# Patient Record
Sex: MALE | Race: BLACK OR AFRICAN AMERICAN | NOT HISPANIC OR LATINO | Employment: UNEMPLOYED | URBAN - METROPOLITAN AREA
[De-identification: names, ages, dates, MRNs, and addresses within clinical notes are randomized per-mention and may not be internally consistent; named-entity substitution may affect disease eponyms.]

---

## 2023-10-01 PROBLEM — M87.052 AVASCULAR NECROSIS OF BONE OF LEFT HIP (MULTI): Status: ACTIVE | Noted: 2023-10-01

## 2023-10-01 PROBLEM — M87.051 AVASCULAR NECROSIS OF BONE OF RIGHT HIP (MULTI): Status: ACTIVE | Noted: 2023-10-01

## 2023-10-01 PROBLEM — M25.351 INSTABILITY OF RIGHT HIP JOINT: Status: ACTIVE | Noted: 2023-10-01

## 2023-10-01 RX ORDER — NAPROXEN 500 MG/1
1 TABLET ORAL EVERY 12 HOURS
COMMUNITY
Start: 2019-10-09 | End: 2023-10-03 | Stop reason: ALTCHOICE

## 2023-10-01 RX ORDER — MELOXICAM 15 MG/1
1 TABLET ORAL DAILY
COMMUNITY
Start: 2022-10-18 | End: 2023-10-03 | Stop reason: ALTCHOICE

## 2023-10-03 ENCOUNTER — HOSPITAL ENCOUNTER (OUTPATIENT)
Dept: RADIOLOGY | Facility: HOSPITAL | Age: 33
Discharge: HOME | End: 2023-10-03
Payer: MEDICAID

## 2023-10-03 ENCOUNTER — OFFICE VISIT (OUTPATIENT)
Dept: ORTHOPEDIC SURGERY | Facility: HOSPITAL | Age: 33
End: 2023-10-03
Payer: MEDICAID

## 2023-10-03 VITALS — WEIGHT: 211 LBS | BODY MASS INDEX: 29.54 KG/M2 | HEIGHT: 71 IN

## 2023-10-03 DIAGNOSIS — M87.051 AVASCULAR NECROSIS OF BONE OF RIGHT HIP (MULTI): Primary | ICD-10-CM

## 2023-10-03 DIAGNOSIS — M87.051 AVASCULAR NECROSIS OF BONE OF RIGHT HIP (MULTI): ICD-10-CM

## 2023-10-03 DIAGNOSIS — M25.551 RIGHT HIP PAIN: ICD-10-CM

## 2023-10-03 PROCEDURE — 73502 X-RAY EXAM HIP UNI 2-3 VIEWS: CPT | Mod: RT,FY

## 2023-10-03 PROCEDURE — 73502 X-RAY EXAM HIP UNI 2-3 VIEWS: CPT | Mod: RIGHT SIDE | Performed by: RADIOLOGY

## 2023-10-03 PROCEDURE — 99214 OFFICE O/P EST MOD 30 MIN: CPT | Performed by: ORTHOPAEDIC SURGERY

## 2023-10-03 PROCEDURE — 1036F TOBACCO NON-USER: CPT | Performed by: ORTHOPAEDIC SURGERY

## 2023-10-03 RX ORDER — MELOXICAM 15 MG/1
15 TABLET ORAL DAILY
Qty: 30 TABLET | Refills: 0 | Status: CANCELLED | OUTPATIENT
Start: 2023-10-03

## 2023-10-03 ASSESSMENT — PAIN - FUNCTIONAL ASSESSMENT: PAIN_FUNCTIONAL_ASSESSMENT: 0-10

## 2023-10-03 ASSESSMENT — PAIN DESCRIPTION - DESCRIPTORS: DESCRIPTORS: DISCOMFORT;PATIENT UNABLE TO DESCRIBE

## 2023-10-03 ASSESSMENT — PAIN SCALES - GENERAL: PAINLEVEL_OUTOF10: 10 - WORST POSSIBLE PAIN

## 2023-10-03 NOTE — PROGRESS NOTES
Subjective    Patient ID: Norbert Haro is a 33 y.o. male.    Chief Complaint: Pain of the Right Hip (AVN R Hip )     Last Surgery: No surgery found  Last Surgery Date: No surgery found    HPI  Patient is a pleasant 33 years with male who presents today for follow-up evaluation of right hip pain.  I saw him about a year ago with the same complaint.  He was noted to have severe avascular necrosis of the right hip with collapse.  I referred him to physical therapy and injection.  Patient performed therapy stretching exercises without improvement.  He also had image guided corticosteroid injection into the hip without improvement.  Patient reported that he is severely disabled at this point.  He is unable to walk due to pain in the hip.  He is unable to perform routine activities such as putting on his shoes.At this point patient would like to have his hip replaced.  Patient denies any drug use.  Patient reports that he has stayed out of trouble and has been clean since he was released from [facility].  He only has about 10 months left on his probation.  Patient plans to move in with his mother during his rehabitation after surgery.  Objective Ortho Exam  Gen: The patient is alert and oriented ×3, is in no acute distress, and appear their stated age and weight.     Patient has a severe otalgic gait on the right.  His right leg is slightly shorter than the left.  Patient has about 10 degrees fixed hip flexion contracture.  He has external rotation 30 degrees and -3 internal rotation.  Patient has obligatory external rotation with hip flexion.  Patient had pain in the groin with attempted rotational range of motion of the hip.  Image Results:  XR hip w pelvis  MRN: [id number]  Patient Name: NORBERT HARO     STUDY:  HIP, UNILATERAL W/PELVIS WHEN PERFORMED 2-3 VIEWS;  10/18/2022 10:04  am     INDICATION:  Instability of right hip joint; Pain of right hip joint  M25.551:  Pain of right hip joint M25.351: Instability of  right hip joint.     COMPARISON:  10/09/2019     ACCESSION NUMBER(S):  [id number]     ORDERING CLINICIAN:  DARYL CHIU     FINDINGS:  Pelvis and right hip, three views     There is sclerosis with collapse involving the right femoral head.  There is sclerosis and left femoral head as well. Mild degenerative  change present in the right hip. No joint space narrowing     IMPRESSION:  Avascular necrosis with collapse in the right femoral head. Avascular  necrosis in the left femoral head without collapse    I personally reviewed right hip radiograph and review of severe avascular necrosis with collapse and no preservation of joint space.  Assessment/Plan   Encounter Diagnoses:  Avascular necrosis of bone of right hip (CMS/HCC)  Patient has severe right hip pain with avascular necrosis and collapse.  Patient would like to pursue hip replacement surgery at this point.  I talked with the patient at length about risks, limitations, benefits and alternatives to total hip replacement today. Under my care or the care of previous providers, the patient has had a reasonable trial of nonoperative treatment for their hip problem including NSAIDS, tylenol  or other analgesics, activity modification and activity restriction and use of assistive devices.  These  previous treatments have not provided the patient with durable relief of their symptoms.The patient is not an appropriate candidate for physical therapy at this time. Despite the above, patient has had pain and symptomatic functional impairment that either interferes with their sleep or ADLs and quality of life. I reviewed concerns about implant wear, loosening, breakage, infection and infection prophylaxis, DVT, PE, death and other medical and anesthetic complications of surgery. We talked about the potential for persistent pain following surgery since there are many possible causes for hip and leg pain. The patient was advised that hip replacement will only relieve pain  that is coming from the hip. We talked about leg length discrepancy, neurovascular problems and dislocation after surgery. The patient understands that we may have to lengthen the leg slightly to provide for adequate stability of the hip. I reviewed dislocation precautions and activity restrictions in detail. We discussed advantages and disadvantages of cemented and cementless implant fixation. We discussed the concerns about intraoperative fracture, ingrowth failure, thigh pain and possible post-operative weight bearing restrictions following cementless hip replacement. We discussed advantages and disadvantages of different surgical approaches. The basic concepts of the joint replacement procedure has been reviewed with the patient and the patient has been provided the opportunity to see an actual implant either in the office or in our pre-op education class. We discussed the possible need for a homologous blood transfusion. We discussed the fact that many of our patients are able to go home as outpatient or 1 -2 days depending on their health, mobility, pre-op preparation, individual home situation and personal preference. The patient should take our pre-operative teaching class. All of the patients questions were answered. The patient can call my office to schedule surgery and the pre-op teaching class. I told the patient that they should contact their primary care physician to discuss fitness for surgery.          Note dictated with Clouds[employer] software.  Completed without full type editing and sent to avoid delay.    Orders Placed This Encounter    XR hip right 2 or 3 views    XR pelvis 1-2 views     No follow-ups on file.

## 2023-10-04 RX ORDER — MELOXICAM 15 MG/1
15 TABLET ORAL DAILY
Qty: 30 TABLET | Refills: 0 | Status: SHIPPED | OUTPATIENT
Start: 2023-10-04 | End: 2023-11-03

## 2023-10-31 ENCOUNTER — ANESTHESIA EVENT (OUTPATIENT)
Dept: OPERATING ROOM | Facility: HOSPITAL | Age: 33
End: 2023-10-31
Payer: MEDICAID

## 2023-11-01 ENCOUNTER — ANESTHESIA (OUTPATIENT)
Dept: OPERATING ROOM | Facility: HOSPITAL | Age: 33
End: 2023-11-01
Payer: MEDICAID

## 2023-11-01 NOTE — H&P
[facility] Department of Orthopaedic Surgery   Surgical History & Physical <30 Days  10/3/2023    Reason for Surgery: R Hip OA  Planned Procedure: R DA JED    History & Physical Reviewed:  I have reviewed the History and Physical for obtained within the last 30 days. Relevant findings and updates are noted below:  No significant changes.    Home medications were reviewed with significant updates noted below:  No significant changes.    ERAS patient?: No    COVID-19 Risk Consent:   Surgeon has reviewed the key risks related to arabella COVID-19 and subsequent sequelae.     11/01/23 at 6:01 AM - Yanick Sears MD

## 2023-11-13 ENCOUNTER — TELEMEDICINE CLINICAL SUPPORT (OUTPATIENT)
Dept: PREADMISSION TESTING | Facility: HOSPITAL | Age: 33
End: 2023-11-13
Payer: MEDICAID

## 2023-11-13 RX ORDER — IBUPROFEN 600 MG/1
600 TABLET ORAL EVERY 8 HOURS PRN
COMMUNITY
Start: 2023-10-26 | End: 2023-11-21 | Stop reason: ALTCHOICE

## 2023-11-13 RX ORDER — MELOXICAM 15 MG/1
15 TABLET ORAL DAILY
COMMUNITY
Start: 2023-10-04 | End: 2023-11-21 | Stop reason: ALTCHOICE

## 2023-11-13 RX ORDER — AMLODIPINE BESYLATE 5 MG/1
5 TABLET ORAL DAILY
COMMUNITY
Start: 2023-09-19

## 2023-11-21 ENCOUNTER — PRE-ADMISSION TESTING (OUTPATIENT)
Dept: PREADMISSION TESTING | Facility: HOSPITAL | Age: 33
End: 2023-11-21
Payer: MEDICAID

## 2023-11-21 ENCOUNTER — HOSPITAL ENCOUNTER (OUTPATIENT)
Dept: RADIOLOGY | Facility: HOSPITAL | Age: 33
Discharge: HOME | End: 2023-11-21
Payer: MEDICAID

## 2023-11-21 VITALS
DIASTOLIC BLOOD PRESSURE: 81 MMHG | WEIGHT: 195.11 LBS | BODY MASS INDEX: 27.31 KG/M2 | HEART RATE: 94 BPM | OXYGEN SATURATION: 97 % | HEIGHT: 71 IN | SYSTOLIC BLOOD PRESSURE: 126 MMHG | TEMPERATURE: 98 F

## 2023-11-21 DIAGNOSIS — Z01.818 PREOPERATIVE TESTING: Primary | ICD-10-CM

## 2023-11-21 LAB
ANION GAP SERPL CALC-SCNC: 16 MMOL/L (ref 10–20)
BUN SERPL-MCNC: 12 MG/DL (ref 6–23)
CALCIUM SERPL-MCNC: 8.8 MG/DL (ref 8.6–10.6)
CHLORIDE SERPL-SCNC: 104 MMOL/L (ref 98–107)
CO2 SERPL-SCNC: 23 MMOL/L (ref 21–32)
CREAT SERPL-MCNC: 1.03 MG/DL (ref 0.5–1.3)
ERYTHROCYTE [DISTWIDTH] IN BLOOD BY AUTOMATED COUNT: 12.4 % (ref 11.5–14.5)
GFR SERPL CREATININE-BSD FRML MDRD: >90 ML/MIN/1.73M*2
GLUCOSE SERPL-MCNC: 116 MG/DL (ref 74–99)
HCT VFR BLD AUTO: 48.1 % (ref 41–52)
HGB BLD-MCNC: 16.1 G/DL (ref 13.5–17.5)
MCH RBC QN AUTO: 31.8 PG (ref 26–34)
MCHC RBC AUTO-ENTMCNC: 33.5 G/DL (ref 32–36)
MCV RBC AUTO: 95 FL (ref 80–100)
NRBC BLD-RTO: 0 /100 WBCS (ref 0–0)
PLATELET # BLD AUTO: 241 X10*3/UL (ref 150–450)
POTASSIUM SERPL-SCNC: 4.5 MMOL/L (ref 3.5–5.3)
RBC # BLD AUTO: 5.07 X10*6/UL (ref 4.5–5.9)
SODIUM SERPL-SCNC: 138 MMOL/L (ref 136–145)
WBC # BLD AUTO: 5.3 X10*3/UL (ref 4.4–11.3)

## 2023-11-21 PROCEDURE — 72170 X-RAY EXAM OF PELVIS: CPT

## 2023-11-21 PROCEDURE — 87081 CULTURE SCREEN ONLY: CPT

## 2023-11-21 PROCEDURE — 85027 COMPLETE CBC AUTOMATED: CPT

## 2023-11-21 PROCEDURE — 80048 BASIC METABOLIC PNL TOTAL CA: CPT

## 2023-11-21 PROCEDURE — 36415 COLL VENOUS BLD VENIPUNCTURE: CPT

## 2023-11-21 PROCEDURE — 72170 X-RAY EXAM OF PELVIS: CPT | Performed by: RADIOLOGY

## 2023-11-21 RX ORDER — HYDROXYZINE PAMOATE 50 MG/1
50 CAPSULE ORAL 2 TIMES DAILY
COMMUNITY
Start: 2023-07-31

## 2023-11-21 RX ORDER — NAPROXEN 375 MG/1
375 TABLET ORAL
COMMUNITY
Start: 2023-08-31 | End: 2023-11-30 | Stop reason: HOSPADM

## 2023-11-21 RX ORDER — CHLORHEXIDINE GLUCONATE ORAL RINSE 1.2 MG/ML
SOLUTION DENTAL
Qty: 15 ML | Refills: 0 | Status: SHIPPED | OUTPATIENT
Start: 2023-11-21 | End: 2023-11-21 | Stop reason: ENTERED-IN-ERROR

## 2023-11-21 RX ORDER — CHLORHEXIDINE GLUCONATE 40 MG/ML
SOLUTION TOPICAL
Qty: 473 ML | Refills: 0 | Status: SHIPPED | OUTPATIENT
Start: 2023-11-21 | End: 2023-11-21 | Stop reason: ENTERED-IN-ERROR

## 2023-11-21 RX ORDER — ACETAMINOPHEN 325 MG/1
650 TABLET ORAL EVERY 4 HOURS PRN
COMMUNITY
Start: 2023-01-09 | End: 2023-11-30 | Stop reason: HOSPADM

## 2023-11-21 RX ORDER — CHLORHEXIDINE GLUCONATE 40 MG/ML
SOLUTION TOPICAL
Qty: 473 ML | Refills: 0 | Status: SHIPPED | OUTPATIENT
Start: 2023-11-21 | End: 2023-11-30 | Stop reason: HOSPADM

## 2023-11-21 RX ORDER — TRAZODONE HYDROCHLORIDE 50 MG/1
150 TABLET ORAL NIGHTLY
COMMUNITY
Start: 2023-09-18 | End: 2023-12-31 | Stop reason: SDUPTHER

## 2023-11-21 RX ORDER — CHLORHEXIDINE GLUCONATE ORAL RINSE 1.2 MG/ML
SOLUTION DENTAL
Qty: 15 ML | Refills: 0 | Status: SHIPPED | OUTPATIENT
Start: 2023-11-21 | End: 2023-11-30 | Stop reason: HOSPADM

## 2023-11-21 RX ORDER — ARIPIPRAZOLE 5 MG/1
5 TABLET ORAL
COMMUNITY
Start: 2023-07-31

## 2023-11-21 ASSESSMENT — DUKE ACTIVITY SCORE INDEX (DASI)
CAN YOU PARTICIPATE IN STRENOUS SPORTS LIKE SWIMMING, SINGLES TENNIS, FOOTBALL, BASKETBALL, OR SKIING: NO
CAN YOU RUN A SHORT DISTANCE: NO
CAN YOU DO YARD WORK LIKE RAKING LEAVES, WEEDING OR PUSHING A MOWER: NO
CAN YOU TAKE CARE OF YOURSELF (EAT, DRESS, BATHE, OR USE TOILET): YES
CAN YOU CLIMB A FLIGHT OF STAIRS OR WALK UP A HILL: NO
CAN YOU DO MODERATE WORK AROUND THE HOUSE LIKE VACUUMING, SWEEPING FLOORS OR CARRYING GROCERIES: NO
TOTAL_SCORE: 4.5
CAN YOU PARTICIPATE IN MODERATE RECREATIONAL ACTIVITIES LIKE GOLF, BOWLING, DANCING, DOUBLES TENNIS OR THROWING A BASEBALL OR FOOTBALL: NO
DASI METS SCORE: 3.3
CAN YOU WALK INDOORS, SUCH AS AROUND YOUR HOUSE: YES
CAN YOU HAVE SEXUAL RELATIONS: NO
CAN YOU DO LIGHT WORK AROUND THE HOUSE LIKE DUSTING OR WASHING DISHES: NO
CAN YOU WALK A BLOCK OR TWO ON LEVEL GROUND: NO
CAN YOU DO HEAVY WORK AROUND THE HOUSE LIKE SCRUBBING FLOORS OR LIFTING AND MOVING HEAVY FURNITURE: NO

## 2023-11-21 ASSESSMENT — CHADS2 SCORE
PRIOR STROKE OR TIA OR THROMBOEMBOLISM: NO
AGE GREATER THAN OR EQUAL TO 75: NO
HYPERTENSION: NO
DIABETES: NO
CHADS2 SCORE: 0
CHF: NO

## 2023-11-21 ASSESSMENT — LIFESTYLE VARIABLES: SMOKING_STATUS: SMOKER

## 2023-11-21 NOTE — CPM/PAT H&P
CPM/PAT Evaluation       Name: Norbert Haro (Norbert Haro)  /Age: 1990/33 y.o.     Visit Type:   In-Person       Chief Complaint: preoperative evaluation, right hip pain    HPI  The patient is a 33 year old Black male with complaints of ongoing right hip pain. He is noted to have severe avascular necrosis of the right hip with collapse. He has failed medical management and wishes to proceed with surgical intervention. He presents today for perioperative evaluation in anticipation of Right Arthroplasty Total Hip Direct Anterior Approach on 23 with Dr. Aguayo.    Past Medical History:   Diagnosis Date    Joint pain 10/03/2023    Ortho:Trevor OLIVAS hip       History reviewed. No pertinent surgical history.    Patient Sexual activity questions deferred to the physician.    Family History   Problem Relation Name Age of Onset    Lung cancer Father         No Known Allergies    Prior to Admission medications    Medication Sig Start Date End Date Taking? Authorizing Provider   acetaminophen (Tylenol) 325 mg tablet Take 2 tablets (650 mg) by mouth every 4 hours if needed. 23  Yes Historical Provider, MD   ARIPiprazole (Abilify) 5 mg tablet Take 1 tablet (5 mg) by mouth once daily. 23  Yes Historical Provider, MD   hydrOXYzine pamoate (Vistaril) 50 mg capsule Take 1 capsule (50 mg) by mouth twice a day. 23  Yes Historical Provider, MD   naproxen (Naprosyn) 375 mg tablet Take 1 tablet (375 mg) by mouth 2 times a day with meals. 23  Yes Historical Provider, MD   traZODone (Desyrel) 50 mg tablet Take 3 tablets (150 mg) by mouth once daily at bedtime. 23  Yes Historical Provider, MD   amLODIPine (Norvasc) 5 mg tablet Take 1 tablet (5 mg) by mouth once daily. 23   Historical Provider, MD   chlorhexidine (Hibiclens) 4 % external liquid Use as directed daily preoperatively 23   AILYN Shaw-CNP   chlorhexidine (Peridex) 0.12 % solution Swish and spit. Do  not swallow. Use the night before and morning of surgery as directed 11/21/23   ORION Shaw   chlorhexidine (Hibiclens) 4 % external liquid Use as directed daily preoperatively 11/21/23 11/21/23  ORION Shaw   chlorhexidine (Peridex) 0.12 % solution Swish and spit. Do not swallow. Use the night before and morning of surgery as directed 11/21/23 11/21/23  ORION Shaw    mg tablet Take 1 tablet (600 mg) by mouth every 8 hours if needed for moderate pain (4 - 6). 10/26/23 11/21/23  Historical Provider, MD   meloxicam (Mobic) 15 mg tablet Take 1 tablet (15 mg) by mouth once daily. 10/4/23 11/21/23  Historical Provider, MD BOND ROS:   Constitutional:   neg    Neuro/Psych:   neg    Eyes:   neg    Ears:   neg    Nose:   neg    Mouth:   neg    Throat:   neg    Neck:   neg    Cardio:   neg    Respiratory:   neg    Endocrine:   neg    GI:   neg    :   neg    Musculoskeletal:    Right hip pain   arthralgias  Hematologic:   neg    Skin:  neg        Physical Exam  Vitals reviewed.   Constitutional:       Appearance: Normal appearance.   HENT:      Head: Normocephalic and atraumatic.      Nose: Nose normal.      Mouth/Throat:      Mouth: Mucous membranes are moist.      Pharynx: Oropharynx is clear.   Eyes:      Extraocular Movements: Extraocular movements intact.      Pupils: Pupils are equal, round, and reactive to light.   Cardiovascular:      Rate and Rhythm: Normal rate and regular rhythm.      Pulses: Normal pulses.      Heart sounds: Normal heart sounds.   Pulmonary:      Effort: Pulmonary effort is normal.      Breath sounds: Normal breath sounds.   Musculoskeletal:         General: Normal range of motion.      Cervical back: Normal range of motion.   Skin:     General: Skin is warm and dry.   Neurological:      General: No focal deficit present.      Mental Status: He is alert and oriented to person, place, and time.   Psychiatric:         Mood and Affect: Mood  normal.         Behavior: Behavior normal.          PAT AIRWAY:   Airway:     Mallampati::  III    TM distance::  >3 FB    Neck ROM::  Full  normal        Visit Vitals  /81   Pulse 94   Temp 36.7 °C (98 °F) (Oral)       DASI Risk Score      Flowsheet Row Most Recent Value   DASI SCORE 4.5   METS Score (Will be calculated only when all the questions are answered) 3.3          Caprini DVT Assessment      Flowsheet Row Most Recent Value   DVT Score 6   Current Status Elective major lower extremity arthroplasty   Age Less than 40 years   BMI 30 or less          Modified Frailty Index      Flowsheet Row Most Recent Value   Modified Frailty Index Calculator 0          CHADS2 Stroke Risk         N/A 3 - 100%: High Risk   2 - 3%: Medium Risk   0 - 2%: Low Risk     Last Change: N/A          This score determines the patient's risk of having a stroke if the patient has atrial fibrillation.        This score is not applicable to this patient. Components are not calculated.          Revised Cardiac Risk Index      Flowsheet Row Most Recent Value   Revised Cardiac Risk Calculator 0          Apfel Simplified Score      Flowsheet Row Most Recent Value   Apfel Simplified Score Calculator 1          Risk Analysis Index Results This Encounter    No data found in the last 1 encounters.       Stop Bang Score      Flowsheet Row Most Recent Value   Do you snore loudly? 0   Do you often feel tired or fatigued after your sleep? 0   Has anyone ever observed you stop breathing in your sleep? 0   Do you have or are you being treated for high blood pressure? 1   Recent BMI (Calculated) 29.4   Is BMI greater than 35 kg/m2? 0=No   Age older than 50 years old? 0=No   Is your neck circumference greater than 17 inches (Male) or 16 inches (Female)? 0   Gender - Male 1=Yes   STOP-BANG Total Score 2          Recent Results (from the past 336 hour(s))   Staphylococcus aureus/MRSA colonization, Culture    Collection Time: 11/21/23 11:41 AM     Specimen: Anterior Nares; Swab   Result Value Ref Range    Staph/MRSA Screen Culture No Staphylococcus aureus isolated    Basic Metabolic Panel    Collection Time: 11/21/23 11:41 AM   Result Value Ref Range    Glucose 116 (H) 74 - 99 mg/dL    Sodium 138 136 - 145 mmol/L    Potassium 4.5 3.5 - 5.3 mmol/L    Chloride 104 98 - 107 mmol/L    Bicarbonate 23 21 - 32 mmol/L    Anion Gap 16 10 - 20 mmol/L    Urea Nitrogen 12 6 - 23 mg/dL    Creatinine 1.03 0.50 - 1.30 mg/dL    eGFR >90 >60 mL/min/1.73m*2    Calcium 8.8 8.6 - 10.6 mg/dL   CBC    Collection Time: 11/21/23 11:41 AM   Result Value Ref Range    WBC 5.3 4.4 - 11.3 x10*3/uL    nRBC 0.0 0.0 - 0.0 /100 WBCs    RBC 5.07 4.50 - 5.90 x10*6/uL    Hemoglobin 16.1 13.5 - 17.5 g/dL    Hematocrit 48.1 41.0 - 52.0 %    MCV 95 80 - 100 fL    MCH 31.8 26.0 - 34.0 pg    MCHC 33.5 32.0 - 36.0 g/dL    RDW 12.4 11.5 - 14.5 %    Platelets 241 150 - 450 x10*3/uL            Assessment and Plan:     Neuro:   The patient has no neurological diagnoses or significant findings on chart review, clinical presentation, and evaluation.  No grossly apparent perioperative risk.    HEENT/Airway  No diagnoses, significant findings on chart review, clinical presentation, or evaluation.    Cardiovascular  The patient is scheduled for non-cardiac surgery associated with elevated risk.  The patient has no major cardiac contraindications to non- cardiac surgery.  RCRI  The patient meets 0-1 RCRI criteria and therefore has a less than 1% risk of major adverse cardiac complications.  METS  The patient's functional capacity capacity is greater than 4 METS.  EKG  The patient has no history of EKG or echocardiographic changes concerning for myocardial ischemia.  No further cardiac evaluation is indicated  Heart Failure  The patient has no known history of heart failure.  Additionally, the patient reports no symptoms of heart failure and demonstrates no signs of heart failure.  Hypertension Evaluation  The  patient has no known history of hypertension and has a normal blood pressure today.  Heart Rhythm Evaluation  The patient has no history of arrhythmias.  Heart Valve Evaluation  The patient has no known history of valvular heart disease. The patient has no symptoms or physical exam findings to suggest valvular heart disease.  CARDS EVAL  The patient is not followed by cardiology.  The patient has a 30-day risk for MACE of 0 predictors, 3.9% risk for cardiac death, nonfatal myocardial infarction, and nonfatal cardiac arrest.  SARAH score which indicates a 0% risk of intraoperative or 30-day postoperative.    Pulmonary   No significant findings on chart review or clinical presentation and evaluation. The patient is at increased risk of perioperative pulmonary complications secondary to ongoing tobacco abuse.   The patient has a stop bang score of 2, which places patient at low risk for having MARYLU.  ARISCAT 0, low, 1.6% risk of in-hospital postoperative pulmonary complications  PRODIGY 11, intermediate of respiratory depression episode.    Hematology  No diagnoses or significant findings on chart review or clinical presentation and evaluation.  Antiplatelet management   The patient is not currently receiving antiplatelet therapy.  Anticoagulation management  The patient is not currently receiving anticoagulation therapy.,  Patient provided with DVT educational handout.    Caprini score 6, high risk of perioperative VTE    GI  No diagnoses or significant findings on chart review or clinical presentation and evaluation.  Eat 10- 0,  self-perceived oropharyngeal dysphagia scale (0-40)     Genitourinary  No diagnoses or significant findings on chart review or clinical presentation and evaluation.    Renal  The patient has no known history of chronic kidney disease. No renal diagnoses or significant findings on chart review or clinical presentation and evaluation. The patient has specific risk factors associated with  increased risk of perioperative renal complications due to male gender. Preventative measures include preoperative hydration.    Musculoskeletal  The patient has diagnoses or significant findings on chart review or clinical presentation and evaluation significant for right hip pain. Scheduled for arthoplasty 11/30/23. Avoid NSAIDS 7 days prior to surgery.    Endocrine  Diabetes Evaluation  The patient has no history of diabetes mellitus  Thyroid Disease Evaluation  The patient has no history of thyroid disease.    ID  No diagnoses or significant findings on chart review or clinical presentation and evaluation.    -Preoperative medication instructions were provided and reviewed with the patient.  Any additional testing or evaluation was explained to the patient.  NPO Instructions were discussed, and the patient's questions were answered prior to conclusion of this encounter

## 2023-11-21 NOTE — PREPROCEDURE INSTRUCTIONS
NPO Instructions:    Do not eat any food after midnight the night before your surgery/procedure.  You may have up to TEN ounces of clear liquids until TWO hours before your instructed arrival time to the hospital. This includes water, black tea/coffee, (no milk or cream), apple juice, and/or electrolyte drinks (Gatorade).  Yo may chew gum up to TWO hours before your surgery/procedure.    Additional Instructions:     We have sent a prescription for Hibiclens soap and Peridex mouth wash to your preferred pharmacy.  If you have not already, Please  your prescription and start using five days before surgery.  Follow the instruction sheet provided to you at your CPM/PAT appointment.    Avoid herbal supplements, multivitamins and NSAIDS (non-steroidal anti-inflammatory drugs) such as Advil, Aleve, Ibuprofen, Naproxen, Excedrin, Meloxicam or Celebrex for at least 7 days prior to surgery. May take Tylenol as needed.    Seven/Six Days before Surgery:  Review your medication instructions, stop indicated medications    Day of Surgery:  Review your medication instructions, take indicated medications  Wear comfortable loose fitting clothing  Do not use moisturizers, creams, lotions or perfume  All jewelry and valuables should be left at home    Jose Armando Garcia [facility]  [facility] Per[facility]  Kaxzp-[phone]  [phone]  [email or web address]

## 2023-11-22 LAB — STAPHYLOCOCCUS SPEC CULT: NORMAL

## 2023-11-29 ASSESSMENT — ENCOUNTER SYMPTOMS
CONSTITUTIONAL NEGATIVE: 1
GASTROINTESTINAL NEGATIVE: 1
ARTHRALGIAS: 1
EYES NEGATIVE: 1
RESPIRATORY NEGATIVE: 1
ENDOCRINE NEGATIVE: 1
CARDIOVASCULAR NEGATIVE: 1
NECK NEGATIVE: 1
NEUROLOGICAL NEGATIVE: 1

## 2023-11-30 ENCOUNTER — HOSPITAL ENCOUNTER (OUTPATIENT)
Facility: HOSPITAL | Age: 33
Setting detail: OUTPATIENT SURGERY
Discharge: HOME | End: 2023-11-30
Attending: ORTHOPAEDIC SURGERY | Admitting: ORTHOPAEDIC SURGERY
Payer: MEDICAID

## 2023-11-30 ENCOUNTER — APPOINTMENT (OUTPATIENT)
Dept: RADIOLOGY | Facility: HOSPITAL | Age: 33
End: 2023-11-30
Payer: MEDICAID

## 2023-11-30 ENCOUNTER — HOME HEALTH ADMISSION (OUTPATIENT)
Dept: HOME HEALTH SERVICES | Facility: HOME HEALTH | Age: 33
End: 2023-11-30
Payer: MEDICAID

## 2023-11-30 VITALS
HEART RATE: 70 BPM | DIASTOLIC BLOOD PRESSURE: 81 MMHG | RESPIRATION RATE: 14 BRPM | OXYGEN SATURATION: 97 % | BODY MASS INDEX: 27.44 KG/M2 | WEIGHT: 195.99 LBS | TEMPERATURE: 97.5 F | HEIGHT: 71 IN | SYSTOLIC BLOOD PRESSURE: 120 MMHG

## 2023-11-30 DIAGNOSIS — M87.051 AVASCULAR NECROSIS OF BONE OF RIGHT HIP (MULTI): Primary | ICD-10-CM

## 2023-11-30 PROCEDURE — 7100000010 HC PHASE TWO TIME - EACH INCREMENTAL 1 MINUTE: Performed by: ORTHOPAEDIC SURGERY

## 2023-11-30 PROCEDURE — 2780000003 HC OR 278 NO HCPCS: Performed by: ORTHOPAEDIC SURGERY

## 2023-11-30 PROCEDURE — 3600000018 HC OR TIME - INITIAL BASE CHARGE - PROCEDURE LEVEL SIX: Performed by: ORTHOPAEDIC SURGERY

## 2023-11-30 PROCEDURE — 2500000004 HC RX 250 GENERAL PHARMACY W/ HCPCS (ALT 636 FOR OP/ED): Mod: SE

## 2023-11-30 PROCEDURE — 76000 FLUOROSCOPY <1 HR PHYS/QHP: CPT

## 2023-11-30 PROCEDURE — 7100000009 HC PHASE TWO TIME - INITIAL BASE CHARGE: Performed by: ORTHOPAEDIC SURGERY

## 2023-11-30 PROCEDURE — 97163 PT EVAL HIGH COMPLEX 45 MIN: CPT | Mod: GP | Performed by: PHYSICAL THERAPIST

## 2023-11-30 PROCEDURE — 99223 1ST HOSP IP/OBS HIGH 75: CPT | Performed by: STUDENT IN AN ORGANIZED HEALTH CARE EDUCATION/TRAINING PROGRAM

## 2023-11-30 PROCEDURE — 3700000001 HC GENERAL ANESTHESIA TIME - INITIAL BASE CHARGE: Performed by: ORTHOPAEDIC SURGERY

## 2023-11-30 PROCEDURE — 72170 X-RAY EXAM OF PELVIS: CPT

## 2023-11-30 PROCEDURE — 2500000005 HC RX 250 GENERAL PHARMACY W/O HCPCS: Mod: SE | Performed by: ANESTHESIOLOGY

## 2023-11-30 PROCEDURE — 72170 X-RAY EXAM OF PELVIS: CPT | Performed by: RADIOLOGY

## 2023-11-30 PROCEDURE — 2500000004 HC RX 250 GENERAL PHARMACY W/ HCPCS (ALT 636 FOR OP/ED): Mod: SE | Performed by: ANESTHESIOLOGY

## 2023-11-30 PROCEDURE — 2500000001 HC RX 250 WO HCPCS SELF ADMINISTERED DRUGS (ALT 637 FOR MEDICARE OP): Mod: SE

## 2023-11-30 PROCEDURE — C1776 JOINT DEVICE (IMPLANTABLE): HCPCS | Performed by: ORTHOPAEDIC SURGERY

## 2023-11-30 PROCEDURE — 2500000004 HC RX 250 GENERAL PHARMACY W/ HCPCS (ALT 636 FOR OP/ED): Mod: SE | Performed by: ORTHOPAEDIC SURGERY

## 2023-11-30 PROCEDURE — 76942 ECHO GUIDE FOR BIOPSY: CPT | Performed by: ANESTHESIOLOGY

## 2023-11-30 PROCEDURE — 3700000002 HC GENERAL ANESTHESIA TIME - EACH INCREMENTAL 1 MINUTE: Performed by: ORTHOPAEDIC SURGERY

## 2023-11-30 PROCEDURE — 7100000001 HC RECOVERY ROOM TIME - INITIAL BASE CHARGE: Performed by: ORTHOPAEDIC SURGERY

## 2023-11-30 PROCEDURE — 7100000002 HC RECOVERY ROOM TIME - EACH INCREMENTAL 1 MINUTE: Performed by: ORTHOPAEDIC SURGERY

## 2023-11-30 PROCEDURE — 27130 TOTAL HIP ARTHROPLASTY: CPT | Performed by: ORTHOPAEDIC SURGERY

## 2023-11-30 PROCEDURE — 3600000017 HC OR TIME - EACH INCREMENTAL 1 MINUTE - PROCEDURE LEVEL SIX: Performed by: ORTHOPAEDIC SURGERY

## 2023-11-30 PROCEDURE — C1713 ANCHOR/SCREW BN/BN,TIS/BN: HCPCS | Performed by: ORTHOPAEDIC SURGERY

## 2023-11-30 PROCEDURE — 2720000007 HC OR 272 NO HCPCS: Performed by: ORTHOPAEDIC SURGERY

## 2023-11-30 PROCEDURE — A27130 PR TOTAL HIP ARTHROPLASTY: Performed by: ANESTHESIOLOGY

## 2023-11-30 PROCEDURE — A27130 PR TOTAL HIP ARTHROPLASTY: Performed by: NURSE ANESTHETIST, CERTIFIED REGISTERED

## 2023-11-30 PROCEDURE — 2500000005 HC RX 250 GENERAL PHARMACY W/O HCPCS: Mod: SE

## 2023-11-30 DEVICE — IMPLANTABLE DEVICE: Type: IMPLANTABLE DEVICE | Site: HIP | Status: FUNCTIONAL

## 2023-11-30 DEVICE — SCREW, PINNACLE CANCELL 6.5 X 50: Type: IMPLANTABLE DEVICE | Site: HIP | Status: FUNCTIONAL

## 2023-11-30 DEVICE — SCREW CANCELLOUS 6.5 X 25: Type: IMPLANTABLE DEVICE | Site: HIP | Status: FUNCTIONAL

## 2023-11-30 DEVICE — ACETABULAR CUP, SECTOR, GRIPTON, SIZE 56MM: Type: IMPLANTABLE DEVICE | Site: HIP | Status: FUNCTIONAL

## 2023-11-30 DEVICE — FEMORAL HEAD, CERAMIC 36 +5: Type: IMPLANTABLE DEVICE | Site: HIP | Status: FUNCTIONAL

## 2023-11-30 RX ORDER — HYDROMORPHONE HYDROCHLORIDE 1 MG/ML
0.2 INJECTION, SOLUTION INTRAMUSCULAR; INTRAVENOUS; SUBCUTANEOUS
Status: DISCONTINUED | OUTPATIENT
Start: 2023-11-30 | End: 2023-11-30 | Stop reason: HOSPADM

## 2023-11-30 RX ORDER — OXYCODONE HYDROCHLORIDE 5 MG/1
5 TABLET ORAL EVERY 6 HOURS PRN
Status: DISCONTINUED | OUTPATIENT
Start: 2023-11-30 | End: 2023-11-30 | Stop reason: HOSPADM

## 2023-11-30 RX ORDER — OXYCODONE HYDROCHLORIDE 5 MG/1
5 TABLET ORAL EVERY 4 HOURS PRN
Status: DISCONTINUED | OUTPATIENT
Start: 2023-11-30 | End: 2023-11-30 | Stop reason: HOSPADM

## 2023-11-30 RX ORDER — ONDANSETRON HYDROCHLORIDE 2 MG/ML
INJECTION, SOLUTION INTRAVENOUS AS NEEDED
Status: DISCONTINUED | OUTPATIENT
Start: 2023-11-30 | End: 2023-11-30

## 2023-11-30 RX ORDER — CYCLOBENZAPRINE HCL 10 MG
10 TABLET ORAL ONCE AS NEEDED
Status: COMPLETED | OUTPATIENT
Start: 2023-11-30 | End: 2023-11-30

## 2023-11-30 RX ORDER — ONDANSETRON HYDROCHLORIDE 2 MG/ML
4 INJECTION, SOLUTION INTRAVENOUS ONCE AS NEEDED
Status: DISCONTINUED | OUTPATIENT
Start: 2023-11-30 | End: 2023-11-30 | Stop reason: HOSPADM

## 2023-11-30 RX ORDER — PROPOFOL 10 MG/ML
INJECTION, EMULSION INTRAVENOUS AS NEEDED
Status: DISCONTINUED | OUTPATIENT
Start: 2023-11-30 | End: 2023-11-30

## 2023-11-30 RX ORDER — DOCUSATE SODIUM 100 MG/1
100 CAPSULE, LIQUID FILLED ORAL DAILY
Qty: 14 CAPSULE | Refills: 0 | Status: SHIPPED | OUTPATIENT
Start: 2023-11-30 | End: 2023-12-14

## 2023-11-30 RX ORDER — FENTANYL CITRATE 50 UG/ML
INJECTION, SOLUTION INTRAMUSCULAR; INTRAVENOUS AS NEEDED
Status: DISCONTINUED | OUTPATIENT
Start: 2023-11-30 | End: 2023-11-30

## 2023-11-30 RX ORDER — CEFAZOLIN SODIUM 2 G/100ML
2 INJECTION, SOLUTION INTRAVENOUS EVERY 8 HOURS
Status: DISCONTINUED | OUTPATIENT
Start: 2023-11-30 | End: 2023-11-30 | Stop reason: HOSPADM

## 2023-11-30 RX ORDER — SCOPOLAMINE 1 MG/3D
1 PATCH, EXTENDED RELEASE TRANSDERMAL ONCE
Status: DISCONTINUED | OUTPATIENT
Start: 2023-11-30 | End: 2023-11-30 | Stop reason: HOSPADM

## 2023-11-30 RX ORDER — HYDROMORPHONE HYDROCHLORIDE 1 MG/ML
0.5 INJECTION, SOLUTION INTRAMUSCULAR; INTRAVENOUS; SUBCUTANEOUS EVERY 2 HOUR PRN
Status: DISCONTINUED | OUTPATIENT
Start: 2023-11-30 | End: 2023-11-30 | Stop reason: HOSPADM

## 2023-11-30 RX ORDER — OXYCODONE HYDROCHLORIDE 5 MG/1
10 TABLET ORAL EVERY 4 HOURS PRN
Status: DISCONTINUED | OUTPATIENT
Start: 2023-11-30 | End: 2023-11-30 | Stop reason: HOSPADM

## 2023-11-30 RX ORDER — VANCOMYCIN HYDROCHLORIDE 1 G/20ML
INJECTION, POWDER, LYOPHILIZED, FOR SOLUTION INTRAVENOUS AS NEEDED
Status: DISCONTINUED | OUTPATIENT
Start: 2023-11-30 | End: 2023-11-30 | Stop reason: HOSPADM

## 2023-11-30 RX ORDER — HYDROMORPHONE HYDROCHLORIDE 1 MG/ML
0.5 INJECTION, SOLUTION INTRAMUSCULAR; INTRAVENOUS; SUBCUTANEOUS EVERY 5 MIN PRN
Status: DISCONTINUED | OUTPATIENT
Start: 2023-11-30 | End: 2023-11-30 | Stop reason: HOSPADM

## 2023-11-30 RX ORDER — ASPIRIN 81 MG/1
81 TABLET ORAL 2 TIMES DAILY
Qty: 60 TABLET | Refills: 0 | Status: SHIPPED | OUTPATIENT
Start: 2023-11-30 | End: 2023-12-30

## 2023-11-30 RX ORDER — CEFAZOLIN SODIUM 2 G/100ML
INJECTION, SOLUTION INTRAVENOUS AS NEEDED
Status: DISCONTINUED | OUTPATIENT
Start: 2023-11-30 | End: 2023-11-30

## 2023-11-30 RX ORDER — CELECOXIB 200 MG/1
CAPSULE ORAL AS NEEDED
Status: DISCONTINUED | OUTPATIENT
Start: 2023-11-30 | End: 2023-11-30

## 2023-11-30 RX ORDER — ACETAMINOPHEN 325 MG/1
650 TABLET ORAL EVERY 4 HOURS PRN
Status: DISCONTINUED | OUTPATIENT
Start: 2023-11-30 | End: 2023-11-30 | Stop reason: HOSPADM

## 2023-11-30 RX ORDER — KETOROLAC TROMETHAMINE 30 MG/ML
30 INJECTION, SOLUTION INTRAMUSCULAR; INTRAVENOUS ONCE
Status: DISCONTINUED | OUTPATIENT
Start: 2023-11-30 | End: 2023-11-30 | Stop reason: HOSPADM

## 2023-11-30 RX ORDER — ACETAMINOPHEN 325 MG/1
TABLET ORAL AS NEEDED
Status: DISCONTINUED | OUTPATIENT
Start: 2023-11-30 | End: 2023-11-30

## 2023-11-30 RX ORDER — TRANEXAMIC ACID 100 MG/ML
INJECTION, SOLUTION INTRAVENOUS AS NEEDED
Status: DISCONTINUED | OUTPATIENT
Start: 2023-11-30 | End: 2023-11-30

## 2023-11-30 RX ORDER — SODIUM CHLORIDE, SODIUM LACTATE, POTASSIUM CHLORIDE, CALCIUM CHLORIDE 600; 310; 30; 20 MG/100ML; MG/100ML; MG/100ML; MG/100ML
100 INJECTION, SOLUTION INTRAVENOUS CONTINUOUS
Status: DISCONTINUED | OUTPATIENT
Start: 2023-11-30 | End: 2023-11-30 | Stop reason: HOSPADM

## 2023-11-30 RX ORDER — MIDAZOLAM HYDROCHLORIDE 1 MG/ML
INJECTION INTRAMUSCULAR; INTRAVENOUS AS NEEDED
Status: DISCONTINUED | OUTPATIENT
Start: 2023-11-30 | End: 2023-11-30

## 2023-11-30 RX ORDER — OXYCODONE AND ACETAMINOPHEN 5; 325 MG/1; MG/1
1 TABLET ORAL EVERY 6 HOURS PRN
Qty: 28 TABLET | Refills: 0 | Status: SHIPPED | OUTPATIENT
Start: 2023-11-30 | End: 2023-12-07

## 2023-11-30 RX ORDER — PROPOFOL 10 MG/ML
INJECTION, EMULSION INTRAVENOUS CONTINUOUS PRN
Status: DISCONTINUED | OUTPATIENT
Start: 2023-11-30 | End: 2023-11-30

## 2023-11-30 RX ORDER — NALOXONE HYDROCHLORIDE 0.4 MG/ML
0.2 INJECTION, SOLUTION INTRAMUSCULAR; INTRAVENOUS; SUBCUTANEOUS EVERY 5 MIN PRN
Status: DISCONTINUED | OUTPATIENT
Start: 2023-11-30 | End: 2023-11-30 | Stop reason: HOSPADM

## 2023-11-30 RX ORDER — LIDOCAINE HYDROCHLORIDE 10 MG/ML
0.1 INJECTION, SOLUTION INFILTRATION; PERINEURAL ONCE
Status: DISCONTINUED | OUTPATIENT
Start: 2023-11-30 | End: 2023-11-30 | Stop reason: HOSPADM

## 2023-11-30 RX ORDER — GABAPENTIN 300 MG/1
CAPSULE ORAL AS NEEDED
Status: DISCONTINUED | OUTPATIENT
Start: 2023-11-30 | End: 2023-11-30

## 2023-11-30 RX ADMIN — PROPOFOL 50 MG: 10 INJECTION, EMULSION INTRAVENOUS at 07:42

## 2023-11-30 RX ADMIN — PROPOFOL 150 MCG/KG/MIN: 10 INJECTION, EMULSION INTRAVENOUS at 07:42

## 2023-11-30 RX ADMIN — OXYCODONE HYDROCHLORIDE 10 MG: 5 TABLET ORAL at 11:18

## 2023-11-30 RX ADMIN — MIDAZOLAM HYDROCHLORIDE 2 MG: 1 INJECTION, SOLUTION INTRAMUSCULAR; INTRAVENOUS at 07:16

## 2023-11-30 RX ADMIN — GABAPENTIN 300 MG: 300 CAPSULE ORAL at 07:08

## 2023-11-30 RX ADMIN — CEFAZOLIN SODIUM 2 G: 2 INJECTION, SOLUTION INTRAVENOUS at 07:43

## 2023-11-30 RX ADMIN — FENTANYL CITRATE 50 MCG: 50 INJECTION, SOLUTION INTRAMUSCULAR; INTRAVENOUS at 07:43

## 2023-11-30 RX ADMIN — CEFAZOLIN SODIUM 2 G: 2 INJECTION, SOLUTION INTRAVENOUS at 14:00

## 2023-11-30 RX ADMIN — CYCLOBENZAPRINE 10 MG: 10 TABLET, FILM COATED ORAL at 14:03

## 2023-11-30 RX ADMIN — SODIUM CHLORIDE, POTASSIUM CHLORIDE, SODIUM LACTATE AND CALCIUM CHLORIDE 100 ML/HR: 600; 310; 30; 20 INJECTION, SOLUTION INTRAVENOUS at 10:13

## 2023-11-30 RX ADMIN — CELECOXIB 200 MG: 200 CAPSULE ORAL at 07:08

## 2023-11-30 RX ADMIN — ACETAMINOPHEN 975 MG: 325 TABLET ORAL at 07:08

## 2023-11-30 RX ADMIN — DEXMEDETOMIDINE HYDROCHLORIDE 40 MCG: 100 INJECTION, SOLUTION INTRAVENOUS at 09:08

## 2023-11-30 RX ADMIN — ONDANSETRON 4 MG: 2 INJECTION INTRAMUSCULAR; INTRAVENOUS at 09:31

## 2023-11-30 RX ADMIN — SODIUM CHLORIDE, SODIUM LACTATE, POTASSIUM CHLORIDE, AND CALCIUM CHLORIDE: 600; 310; 30; 20 INJECTION, SOLUTION INTRAVENOUS at 07:13

## 2023-11-30 RX ADMIN — TRANEXAMIC ACID 1000 MG: 100 INJECTION INTRAVENOUS at 07:43

## 2023-11-30 RX ADMIN — Medication 10 L/MIN: at 09:40

## 2023-11-30 SDOH — HEALTH STABILITY: MENTAL HEALTH: CURRENT SMOKER: 0

## 2023-11-30 ASSESSMENT — PAIN SCALES - GENERAL
PAINLEVEL_OUTOF10: 10 - WORST POSSIBLE PAIN
PAINLEVEL_OUTOF10: 10 - WORST POSSIBLE PAIN
PAINLEVEL_OUTOF10: 0 - NO PAIN
PAINLEVEL_OUTOF10: 10 - WORST POSSIBLE PAIN
PAINLEVEL_OUTOF10: 0 - NO PAIN
PAINLEVEL_OUTOF10: 0 - NO PAIN
PAIN_LEVEL: 5
PAINLEVEL_OUTOF10: 0 - NO PAIN
PAINLEVEL_OUTOF10: 5 - MODERATE PAIN
PAINLEVEL_OUTOF10: 5 - MODERATE PAIN
PAINLEVEL_OUTOF10: 9
PAINLEVEL_OUTOF10: 10 - WORST POSSIBLE PAIN

## 2023-11-30 ASSESSMENT — COGNITIVE AND FUNCTIONAL STATUS - GENERAL
MOVING TO AND FROM BED TO CHAIR: A LITTLE
MOVING FROM LYING ON BACK TO SITTING ON SIDE OF FLAT BED WITH BEDRAILS: A LITTLE
WALKING IN HOSPITAL ROOM: A LITTLE
TURNING FROM BACK TO SIDE WHILE IN FLAT BAD: A LITTLE
CLIMB 3 TO 5 STEPS WITH RAILING: A LITTLE
STANDING UP FROM CHAIR USING ARMS: A LITTLE
MOBILITY SCORE: 18

## 2023-11-30 ASSESSMENT — PAIN - FUNCTIONAL ASSESSMENT
PAIN_FUNCTIONAL_ASSESSMENT: 0-10

## 2023-11-30 ASSESSMENT — COLUMBIA-SUICIDE SEVERITY RATING SCALE - C-SSRS
6. HAVE YOU EVER DONE ANYTHING, STARTED TO DO ANYTHING, OR PREPARED TO DO ANYTHING TO END YOUR LIFE?: NO
2. HAVE YOU ACTUALLY HAD ANY THOUGHTS OF KILLING YOURSELF?: NO
1. IN THE PAST MONTH, HAVE YOU WISHED YOU WERE DEAD OR WISHED YOU COULD GO TO SLEEP AND NOT WAKE UP?: NO

## 2023-11-30 NOTE — H&P
[facility] Department of Orthopaedic Surgery   Surgical History & Physical >30 Days    Reason for Surgery: Right hip AVN  Planned Procedure: Right total hip arthroplasty    History:  Patient is a pleasant 33 year old male who presents today for follow-up evaluation of right hip pain. He was noted to have severe avascular necrosis of the right hip with collapse. Patient performed therapy stretching exercises without improvement.  He also had image guided corticosteroid injection into the hip without improvement.  Patient reported that he is severely disabled at this point.  He is unable to walk due to pain in the hip.  He is unable to perform routine activities such as putting on his shoes.At this point patient would like to have his hip replaced.  Patient denies any drug use.  Patient reports that he has stayed out of trouble and has been clean since he was released from prison.  He only has about 10 months left on his probation.  Patient plans to move in with his mother during his rehabitation after surgery.     Past Medical History:   Diagnosis Date    Joint pain 10/03/2023    Ortho:Trevor OLIVAS hip     No past surgical history on file.  Social History     Tobacco Use    Smoking status: Every Day     Types: Cigars    Smokeless tobacco: Never   Substance Use Topics    Alcohol use: Not Currently     Prior to Admission medications    Medication Sig Start Date End Date Taking? Authorizing Provider   acetaminophen (Tylenol) 325 mg tablet Take 2 tablets (650 mg) by mouth every 4 hours if needed. 1/9/23   Historical Provider, MD   amLODIPine (Norvasc) 5 mg tablet Take 1 tablet (5 mg) by mouth once daily. 9/19/23   Historical Provider, MD   ARIPiprazole (Abilify) 5 mg tablet Take 1 tablet (5 mg) by mouth once daily. 7/31/23   Historical Provider, MD   chlorhexidine (Hibiclens) 4 % external liquid Use as directed daily preoperatively 11/21/23   AILYN Shaw-CNP   chlorhexidine (Peridex) 0.12 %  solution Swish and spit. Do not swallow. Use the night before and morning of surgery as directed 11/21/23   AILYN Shaw-CNP   hydrOXYzine pamoate (Vistaril) 50 mg capsule Take 1 capsule (50 mg) by mouth twice a day. 7/31/23   Historical Provider, MD   naproxen (Naprosyn) 375 mg tablet Take 1 tablet (375 mg) by mouth 2 times a day with meals. 8/31/23   Historical Provider, MD   traZODone (Desyrel) 50 mg tablet Take 3 tablets (150 mg) by mouth once daily at bedtime. 9/18/23   Historical Provider, MD     No Known Allergies    Review of Systems:  Review of Systems   Gen: Denies recent weight loss  Neuro: Denies recent confusion  Ophtho: Denies changes in vision  ENT: Denies changes in hearing  Endo: Denies weight loss/weight gain  CV: Denies chest pain  Resp: Denies shortness of breath  GI: Denies melena/hematochezia  : Denies painful urination  MSK: Per above HPI  Heme: No abnormal bleeding  Psych: Denies hallucinations    Physical Exam:  There were no vitals filed for this visit.  · Physical Exam:   - Constitutional: No acute distress, cooperative  - Eyes: EOM grossly intact  - Head/Neck: Trachea midline  - Respiratory/Thorax: Normal work of breathing  - Cardiovascular: RRR on peripheral palpation  - Gastrointestinal: Nondistended  - Psychological: Appropriate mood/behavior  - Skin: Warm and dry. Additional findings in musculoskeletal evaluation  - Musculoskeletal: Moves all extremities    ERAS patient?: No    COVID-19 Risk Consent:   Surgeon has reviewed the key risks related to arabella COVID-19 and subsequent sequelae.       11/28/23 at 4:54 AM - Raudel Yen MD    Pulmonary embolism

## 2023-11-30 NOTE — BRIEF OP NOTE
Date: 2023  OR Location: [facility] OR    Name: Norbert Haro, : 1990, Age: 33 y.o., MRN: [id number], Sex: male    Diagnosis  Pre-op Diagnosis     * Avascular necrosis of bone of right hip (CMS/HCC) [M87.051] Post-op Diagnosis     * Avascular necrosis of bone of right hip (CMS/HCC) [M87.051]     Procedures  Right Arthroplasty Total Hip Direct Anterior Approach  [address]608 - [place] ARTHRP ACETBLR/PROX FEM PROSTC AGRFT/ALGRFT      Surgeons      * Trevor Aguayo - Primary    Resident/Fellow/Other Assistant:  Surgeon(s) and Role:     * Star Dewitt MD - Assisting    Procedure Summary  Anesthesia: Spinal  ASA: I  Anesthesia Staff: Anesthesiologist: Markel Taylor MD  CRNA: AILYN Wyatt-CRNA  C-AA: JOSE MARTIN Walker  Estimated Blood Loss: 100mL  Intra-op Medications:   Medication Name Total Dose   bupivacaine PF (Marcaine) 0.25 % (2.5 mg/mL) 30 mL, ketorolac (Toradol) 30 mg in sodium chloride 0.9% 30 mL syringe 50 mL              Anesthesia Record               Intraprocedure I/O Totals          Intake    Dexmedetomidine 0.00 mL    The total shown is the total volume documented since Anesthesia Start was filed.    Propofol Drip 0.00 mL    The total shown is the total volume documented since Anesthesia Start was filed.    Total Intake 0 mL       Output    Est. Blood Loss 50 mL    Total Output 50 mL       Net    Net Volume -50 mL          Specimen: No specimens collected     Staff:   Circulator: Rob Venegas RN  Scrub Person: Katie Chairez; Nicole Garcia RN          Findings: AVN of femoral head    Complications:  None; patient tolerated the procedure well.     Disposition: PACU - hemodynamically stable.  Condition: stable    Plan:  WBAT, no hip precautions  ASA 81 bid  Percocet for pain control  Discharge from PACU    Star Dewitt MD  Orthopedic Surgery PGY5

## 2023-11-30 NOTE — CONSULTS
Consults  Acute Pain Service  Norbert Haro is a 33 y.o. year old male patient who presents for R hip total arthroplasty with Dr. Aguayo. Acute Pain consulted for block for postoperative pain control.     Anticipated Postop Pain Issues -   Palliative: typically relieved with IV analgesics and regional local anesthetics  Provocative: typically with movement  Quality: typically burning and aching  Radiation: typically none  Severity: typically severe 8-10/10  Timing: typically constant    Past Medical History:   Diagnosis Date    Joint pain 10/03/2023    Ortho:Trevor Mckeon  R hip        History reviewed. No pertinent surgical history.     Family History   Problem Relation Name Age of Onset    Lung cancer Father          Social History     Socioeconomic History    Marital status: Single     Spouse name: Not on file    Number of children: Not on file    Years of education: Not on file    Highest education level: Not on file   Occupational History    Not on file   Tobacco Use    Smoking status: Every Day     Types: Cigars    Smokeless tobacco: Never   Vaping Use    Vaping Use: Never used   Substance and Sexual Activity    Alcohol use: Not Currently    Drug use: Never    Sexual activity: Defer   Other Topics Concern    Not on file   Social History Narrative    Not on file     Social Determinants of Health     Financial Resource Strain: Not on file   Food Insecurity: Not on file   Transportation Needs: Not on file   Physical Activity: Not on file   Stress: Not on file   Social Connections: Not on file   Intimate Partner Violence: Not on file   Housing Stability: Not on file        No Known Allergies      Review of Systems  Gen: No fatigue, anorexia, insomnia, fever.   Eyes: No vision loss, double vision, drainage, eye pain.   ENT: No pharyngitis, dry mouth, no hearing changes or ear discharge  Cardiac: No chest pain, palpitations, syncope, near syncope.   Pulmonary: No shortness of breath, cough, hemoptysis.    Heme/lymph: No swollen glands, fever, bleeding.   GI: No abdominal pain, change in bowel habits, melena, hematemesis, hematochezia, nausea, vomiting, diarrhea.   : No discharge, dysuria, frequency, urgency, hematuria.  Endo: No polyuria or weight loss.   Musculoskeletal: Negative for any pain or loss of ROM/weakness  Skin: No rashes or lesions  Neuro: Normal speech, no numbness or weakness. No gait difficulties  Review of systems is otherwise negative unless stated above or in history of present illness.    Physical Exam:  Constitutional:  no distress, alert and cooperative  Eyes: clear sclera  Head/Neck: No apparent injury, trachea midline  Respiratory/Thorax: Patent airways, thorax symmetric, breathing comfortably  Cardiovascular: no pitting edema  Gastrointestinal: Nondistended  Musculoskeletal: ROM intact  Extremities: no clubbing  Neurological: alert, mcallister x4  Psychological: Appropriate affect    No results found for this or any previous visit (from the past 24 hour(s)).     Plan:  - R QL ss blocks performed preoperatively on 11/30  - Pain medications per primary team  - Will see on POD1 if inpatient    Acute Pain Team  pg [phone] ph [phone].

## 2023-11-30 NOTE — ANESTHESIA PROCEDURE NOTES
Spinal Block    Patient location during procedure: OR  Start time: 11/30/2023 7:22 AM  End time: 11/30/2023 7:28 AM  Reason for block: primary anesthetic  Staffing  Performed: attending   Authorized by: Markel Taylor MD    Performed by: Markel Taylor MD    Preanesthetic Checklist  Completed: patient identified, IV checked, risks and benefits discussed, surgical consent, monitors and equipment checked, pre-op evaluation, timeout performed and sterile techniques followed  Block Timeout  RN/Licensed healthcare professional reads aloud to the Anesthesia provider and entire team: Patient identity, procedure with side and site, patient position, and as applicable the availability of implants/special equipment/special requirements.  Patient on coagulant treatment: no  Timeout performed at: 11/30/2023 7:25 AM  Spinal Block  Patient position: sitting  Prep: ChloraPrep  Sterility prep: cap, drape, gloves, gown, hand hygiene and mask  Sedation level: light sedation  Patient monitoring: blood pressure, continuous pulse oximetry and heart rate  Approach: midline  Vertebral space: L4-5  Injection technique: single-shot  Needle  Needle type: pencan.   Needle gauge: 24 G  Needle length: 4 in  Free flowing CSF: yes    Assessment  Sensory level: T10 bilateral  Procedure assessment: patient sedated but conversant throughout procedure and patient tolerated procedure well with no immediate complications

## 2023-11-30 NOTE — PROCEDURES
Peripheral Block    Patient location during procedure: pre-op  Start time: 11/30/2023 6:45 AM  End time: 11/30/2023 6:52 AM  Reason for block: at surgeon's request  Staffing  Performed: resident   Authorized by: Jamar Munguia MD    Performed by: Jamar Munguia MD  Preanesthetic Checklist  Completed: patient identified, IV checked, site marked, risks and benefits discussed, surgical consent, monitors and equipment checked, pre-op evaluation and timeout performed   Timeout performed at: 11/30/2023 6:47 AM  Peripheral Block  Patient position: laying flat  Prep: ChloraPrep  Patient monitoring: heart rate and continuous pulse ox  Block type: QL  Laterality: right  Injection technique: single-shot  Guidance: ultrasound guided  Local infiltration: ropivacaine  Infiltration strength: 0.5 %  Dose: 25 mL  Needle  Needle type: Tuohy   Needle gauge: 26 G  Needle length: 8 cm  Needle localization: ultrasound guidance  Assessment  Injection assessment: negative aspiration for heme, no paresthesia on injection, incremental injection and local visualized surrounding nerve on ultrasound  Additional Notes  QL single shot. informed consent obtained. risks and benefits discussed. ASA monitors placed, timeout performed. Pt positioned, prepped with chlorhexidine, draped with sterile towels. Ultrasound guidance used with visualization of the needle throughout duration of the procedure. Aspiration was negative. A total of 25 cc 0.5% ropivacaine, 100mcg epinephrine, and 4mg decadron injected between both sides. Patient tolerated procedure well.     Timeout by RN

## 2023-11-30 NOTE — DISCHARGE INSTR - AVS FIRST PAGE
EMERGENCIES - WHEN TO CONTACT THE SURGEON'S OFFICE IMMEDIATELY  Fever >101 with chills that has been present for at least 48 hours.   Excessive bleeding from incision that will not slow down. A small amount of drainage is normal and expected.  Once pressure is applied and the area is covered, do not continue to check the area regularly.  This will remove pressure and bleeding will continue.  Leave in place for 4-6 hours.  Signs of infection of incision-excessive drainage that is soaking through your dressing (especially if it is pus-like), redness that is spreading out from the edges of your incision, or increased warmth around the area.  Excruciating pain for which the pain medication, taken as instructed, is not helping.  Severe calf pain.  Go directly to the emergency room or call 911, if you are experiencing chest pain or difficulty breathing.

## 2023-11-30 NOTE — DISCHARGE INSTR - OTHER ORDERS
WOUND CARE  Patient may shower over dressing.  Do not let the water directly hit the dressing. Pat dry when finished.  After 7 days remove the dressing. There are dissolvable sutures. Keep incision clean and dry after dressing removed. May cover with bandage as needed.  Do not apply creams or lotions.  No tub soaks.  If you experience continued drainage or bleeding, you may cover with abdominal pads (purchase at local drug store).  Knee replacements should wrap with an ace wrap.  Your surgical bandage will be removed by you or your home therapist 1 week after surgery.    DENTAL PROCEDURES & CLEANINGS  You must wait a minimum of 3 months for elective dental appointments, including routine cleanings or dental work including bridges, crowns, extractions, etc..  For any dental visit - cleaning or dental procedures - patients must take an antibiotic 1 hour before the appointment.  Antibiotics are a lifelong need before dental appointments.  The antibiotic prescribed will be based on each patient's allergies.

## 2023-11-30 NOTE — ANESTHESIA POSTPROCEDURE EVALUATION
Patient: Norbert Haro    Procedure Summary       Date: 11/30/23 Room / Location: [facility] OR 09 / [facility] OR    Anesthesia Start: 0723 Anesthesia Stop: 0948    Procedure: Right Arthroplasty Total Hip Direct Anterior Approach (Right) Diagnosis:       Avascular necrosis of bone of right hip (CMS/HCC)      (Avascular necrosis of bone of right hip (CMS/HCC) [M87.051])    Surgeons: Trevor Aguayo MD Responsible Provider: Markel Taylor MD    Anesthesia Type: spinal ASA Status: 1            Anesthesia Type: spinal    Vitals Value Taken Time   /68 11/30/23 1200   Temp 36.8 °C (98.2 °F) 11/30/23 0941   Pulse 62 11/30/23 1202   Resp 14 11/30/23 1202   SpO2 95 % 11/30/23 1202   Vitals shown include unvalidated device data.    Anesthesia Post Evaluation    Patient location during evaluation: PACU  Patient participation: complete - patient participated  Level of consciousness: awake  Pain score: 5  Pain management: adequate  Airway patency: patent  Cardiovascular status: acceptable  Respiratory status: acceptable  Hydration status: acceptable  Postoperative Nausea and Vomiting: none        There were no known notable events for this encounter.

## 2023-11-30 NOTE — ANESTHESIA PREPROCEDURE EVALUATION
Patient: Norbert Haro    Procedure Information       Anesthesia Start Date/Time: 11/30/23 0723    Procedure: Right Arthroplasty Total Hip Direct Anterior Approach (Right)    Location: [facility] OR 09 / [facility] OR    Surgeons: Trevor Aguayo MD            Relevant Problems   Anesthesia   No history of complications History of anesthesia complications   No history of complications PONV (postoperative nausea and vomiting)      Cardiovascular (within normal limits)      Endocrine (within normal limits)      GI (within normal limits)      /Renal (within normal limits)      Neuro/Psych (within normal limits)      Pulmonary (within normal limits)      GI/Hepatic (within normal limits)      Hematology (within normal limits)      Musculoskeletal (within normal limits)      Eyes, Ears, Nose, and Throat (within normal limits)       Clinical information reviewed:   Tobacco  Allergies  Meds   Med Hx  Surg Hx   Fam Hx  Soc Hx        NPO Detail:  NPO/Void Status  Carbonhydrate Drink Given Prior to Surgery? : N  Date of Last Liquid: 11/29/23  Time of Last Liquid: 0000  Date of Last Solid: 11/29/23  Time of Last Solid: 0000  Last Intake Type: Clear fluids  Time of Last Void: 0611         Physical Exam    Airway  Mallampati: II  TM distance: >3 FB     Cardiovascular   Rhythm: regular  Rate: normal     Dental - normal exam     Pulmonary - normal exam     Abdominal - normal exam             Anesthesia Plan    ASA 1     spinal     The patient is not a current smoker.    intravenous induction   Anesthetic plan and risks discussed with patient.  Use of blood products discussed with patient who.    Plan discussed with CAA.

## 2023-11-30 NOTE — OP NOTE
Right Arthroplasty Total Hip Direct Anterior Approach (R) Operative Note     Date: 2023  OR Location: [facility] OR    Name: Norbert Haro, : 1990, Age: 33 y.o., MRN: [id number], Sex: male    Diagnosis  Pre-op Diagnosis     * Avascular necrosis of bone of right hip (CMS/HCC) [M87.051] Post-op Diagnosis     * Avascular necrosis of bone of right hip (CMS/HCC) [M87.051]     Procedures  Right Arthroplasty Total Hip Direct Anterior Approach  [id number] - [place] ARTHRP ACETBLR/PROX FEM PROSTC AGRFT/ALGRFT      Surgeons      * Trevor Aguayo - Primary    Resident/Fellow/Other Assistant:  Surgeon(s) and Role:     * Star Dewitt MD - Assisting  Emeli Hameed PA-C  Procedure Summary  Anesthesia: Spinal  ASA: I  Anesthesia Staff: Anesthesiologist: Markel Taylor MD  CRNA: AILYN Wyatt-CRNA  C-AA: JOSE MARTIN Walker  Estimated Blood Loss: 100 mL  Intra-op Medications:   Medication Name Total Dose   bupivacaine PF (Marcaine) 0.25 % (2.5 mg/mL) 30 mL, ketorolac (Toradol) 30 mg in sodium chloride 0.9% 30 mL syringe 50 mL              Anesthesia Record               Intraprocedure I/O Totals          Intake    Dexmedetomidine 0.00 mL    The total shown is the total volume documented since Anesthesia Start was filed.    .00 mL    Propofol Drip 0.00 mL    The total shown is the total volume documented since Anesthesia Start was filed.    Total Intake 800 mL       Output    Est. Blood Loss 50 mL    Total Output 50 mL       Net    Net Volume 750 mL          Specimen: No specimens collected     Staff:   Circulator: Rob Venegas RN  Scrub Person: Katie Chairez; Nicole Garcia, CARLEE         Drains and/or Catheters: * None in log *    Tourniquet Times:         Implants:  Implants       Type Name Action Serial No.      Joint Hip ACETABULAR CUP, SECTOR, GRIPTON, SIZE 56MM - SLOT - [id number] Implanted LOT     Screw SCREW CANCELLOUS 6.5 X 25 - SLOT - [id number] Implanted LOT     Joint Hip  LINER, ALTRX, +4 NEUTRAL, 36 X 56MM - SLOT - [id number] Implanted LOT     Screw SCREW, PINNACLE CANCELL 6.5 X 50 - SLOT - [id number] Implanted LOT     Joint Hip STEM, ACTIS COLLAR, HIGH, SIZE 3 - SLOT - [id number] Implanted LOT     Joint Hip FEMORAL HEAD, CERAMIC 36 +5 - SLOT - [id number] Implanted LOT              Findings: see op note    Indications: Norbert Haro is an 33 y.o. male who is having surgery for Avascular necrosis of bone of right hip (CMS/[facility]) [M87.051].     The patient was seen in the preoperative area. The risks, benefits, complications, treatment options, non-operative alternatives, expected recovery and outcomes were discussed with the patient. The possibilities of reaction to medication, pulmonary aspiration, injury to surrounding structures, bleeding, recurrent infection, the need for additional procedures, failure to diagnose a condition, and creating a complication requiring transfusion or operation were discussed with the patient. The patient concurred with the proposed plan, giving informed consent.  The site of surgery was properly noted/marked if necessary per policy. The patient has been actively warmed in preoperative area. Preoperative antibiotics have been ordered and given within 1 hours of incision. Venous thrombosis prophylaxis have been ordered including bilateral sequential compression devices and chemical prophylaxis      INDICATIONS:    The patient with longstanding right hip advanced AVN and   osteoarthritis and pain.  The patient has tried conservative  treatment without alleviation of their symptoms.  We discussed  arthroplasty as an option.  The patient was interested and wished to  proceed.  Patient have debilitating pain and wanted to proceed as soon as possible.  We discussed  the risks, benefits and alternatives.  Patient understood and wished to proceed.      PROCEDURE IN DETAIL:    We proceeded to the operating room.  Appropriate time-out was  performed.  Anesthesia was  initiated by the Anesthesia team.   After appropriate time-out was performed, the [place] table boot was  placed on bilateral feet.  We ensured that the boot was not too  tight.  Patient was transferred to the [place] table, placed in a supine  position where all bony prominences were adequately padded. Patient  received preoperative antibiotics and tranexamic acid to minimize  risk of bleeding.  Patient was prepped and draped in the usual sterile  fashion.  We made a direct anterior approach to the hip, centered over the  tensor fascia zandra muscle belly.  Incision was made using the knife.   The fascia of the tensor muscle was incised.  We stayed within the  tensor compartment.  The lateral circumflex vessel was identified and  ligated.  We then elevated the rectus off the anterior hip capsule.   The anterior hip capsule was identified.  Inverted T-capsulotomy was  performed.  We placed a retractor around the neck.  Using the  anterior tubercle of the intertrochanteric line as the landmark, we  made a femoral neck cut.  The head was removed.  The head was  severely deformed and had osteophytes around it.  The patient did  have degenerative labral tear.  The labrum was hypertrophic and some portion was calcified, it was  excised.  We then carefully placed retractors around the ring of the  acetabulum.  We began reaming.  We reamed starting with a 49 mm  reamer.  We then reamed up to 55 mm reamer.  We had good exposed  bleeding bone.  We used fluoroscopy to confirm appropriate reaming  and medialization.  We copiously irrigated and the 56 mm cup was then  placed.  I used the assistance of fluoroscopy and also the plane of the body to  appropriate cup positioning.  I was very satisfied  with the position of the cup.  The cup was impacted into place.   There was excellent stability of the cup.  In order to further  augment the stability, I chose to use one cancellous bone screw  through the hole in the cup.  We had  excellent purchase with this  screw.  We then copiously irrigated.  The neutral liner +4 (used due to significant high hip offset of patient's anatomy) was then  inserted and impacted into place with good fit.  Next, we turned our  attention to preparation of the femur for the stem.  We externally  rotated the femur.  We made a capsular release both medial and  laterally.  We extended the leg.  The femur polina within the wound.   We then used calcar orientation to  the version of the stem.  A  box osteotome was used to lateralize the entry point.  The canal  finder was inserted.  We began broaching.  We started with a starter  broach and broached up to size 3.  There was excellent fit with the  broach.  There was no further subsiding of the broach.  The broach  was rotational and was very stable.  A high offset neck was placed on  the trunnion after it was cleaned based on preoperative templating.  A trial of +5 head  was then placed.  The hip was reduced.  We took the hip through range  of motion.  The hip was very stable under fluoroscopy.  I was very  satisfied with the fit of the stem.  The leg length was equal. The hip was then dislocated.  The  broach was removed.  We then copiously irrigated and the actual stem  was impacted into place.  The collar sat about 1 mm shy of  resting on the calcar.  There was no further subsidence, and the  stent was rotational and stable.  At this point, we then cleaned the  trunnion and a +5, 36 mm head was then placed on the trunnion.  We  proceeded to reduce the hip, took it through extreme range of motion.  Again, the hip was very stable.  Radiographically, leg length was  equal.  We then copiously irrigated the wound bed.  We injected the  joint local solution to surrounding soft tissue.  A deep drain was  placed.  Vancomycin powder was added to the wound.  We then repaired  the capsule and subsequently repaired the fascia of the tensor fascia  zandra.  We then closed the  wound in a layered fashion using 2-0 Biosyn  subcuticular sutures and running 4-0 subcuticular sutures were used.   Dermabond was then applied.  A  sterile dressing was applied.  The  patient was then awakened from sedation.  Prior to leaving the  operating room, I checked his clinical leg length and it was equal.   He was then transferred to PACU in stable condition without any  complication.  PLAN:    The patient will be evaluated by physical therapy and disposition planning.   Patient will be weightbearing as tolerated.  No hip precaution  required.  When patient is discharged, I will see patient in clinic in 2  weeks.  We will obtain x-ray of the AP pelvis, right  AP radiograph,  and cross-table lateral.  I was present for this entire surgical  procedure.     Trevor Aguayo MD.        Complications:  None; patient tolerated the procedure well.    Disposition: PACU - hemodynamically stable.  Condition: stable         Additional Details:     Attending Attestation: I was present and scrubbed for the entire procedure.    Trevor Aguayo  Phone Number: [phone]

## 2023-11-30 NOTE — DISCHARGE INSTR - DIET
RESTARTING HOME ROUTINE - DIET & MEDICATIONS  Post-operative constipation can result due to a combination of inactivity, anesthesia and pain medication. To help prevent this, you should increase your water and fiber intake. Physical activity such as walking will also help stimulate the bowels.   You may resume your normal diet when you discharge home.  Choose foods that help promote good bowel habits and prevent constipation, such as foods high in fiber.  You may restart your home medications the following day after your surgery UNLESS you have been given alternate instructions.  Follow the instructions given to you on your hospital discharge instructions for more information regarding your home medications.

## 2023-11-30 NOTE — DISCHARGE INSTR - ACTIVITY
WEIGHT-BEARING INSTRUCTIONS  You may be weight-bearing as tolerated on your on your right leg at all times. You have no restrictions to range of motion.     DRIVING & TRAVEL AFTER SURGERY   Patients should anticipate waiting at least 4-6 weeks before traveling long distances after surgery.  You will need to stop to walk around ever 1 hour during your travel to help with blood clot prevention.  Please call the office or your joint nurse to discuss prior to post-surgical travel.  Patients may not drive until cleared by the joint nurse or the office.    PERSONAL HYGIENE  You may shower upon discharging from the hospital.  Soap and water is permitted to run over the surgical dressing and incision.  Do not scrub directly over these items.  DO NOT soak your incision in a bath, hot tub, pool or pond/lake for a minimum of 8 weeks following your surgery.  DO NOT use lotions, creams, ointments on your wound for a minimum of 6 weeks following your surgery. At that time you may use vitamin E to assist with softening of your incision.    IN-HOME PHYSICAL THERAPY   In-home physical therapy will start 1-2 days after you get home from the hospital.    The home care agency will call within the first 24-48 hours to set up their first visit.  Please do not call your care team to inquire during this timeframe.  Continue the exercises you were given in the hospital until you have been seen by in-home therapy.  Make sure to provide a phone number with the ability for the home care staff to leave a message if you do not answer your phone.

## 2023-11-30 NOTE — PROGRESS NOTES
Physical Therapy    Physical Therapy Evaluation    Patient Name: Norbert Haro  MRN: [id number]  Today's Date: 11/30/2023   Time Calculation  Start Time: 1325  Stop Time: 1344  Time Calculation (min): 19 min    Assessment/Plan   PT Assessment  PT Assessment Results: Decreased strength, Decreased endurance, Impaired balance, Decreased mobility, Orthopedic restrictions, Decreased skin integrity, Pain  Rehab Prognosis: Excellent  Evaluation/Treatment Tolerance: Patient tolerated treatment well  Medical Staff Made Aware: Yes (PACU RN)  Strengths: Attitude of self, Coping skills, Premorbid level of function, Support of Caregivers, Housing layout  Barriers to Participation:  (none)  End of Session Communication: Bedside nurse  Assessment Comment: 33 year old male with RIght hip AVN/collapse presents s/p RIght anterior THR WBAT with pain, subtle balance deficits and decreased activity tolerance. Recommend home with intermittent assist of mom and low intensity home therapy; already issued wheeled walker.  End of Session Patient Position: Bed, 2 rail up (stretcher in PACU, RN Present)  IP OR SWING BED PT PLAN  Inpatient or Swing Bed: Inpatient  PT Plan  Treatment/Interventions: Bed mobility, Transfer training, Gait training, Stair training, Balance training, Neuromuscular re-education, Endurance training, Strengthening, Range of motion, Therapeutic exercise, Therapeutic activity, Home exercise program, Positioning, Postural re-education  PT Plan: Skilled PT  PT Frequency: BID  PT Discharge Recommendations: Low intensity level of continued care (and intermittent assist of wife)  Equipment Recommended upon Discharge: Wheeled walker (already issued/fit)  PT Recommended Transfer Status: Assist x1, Contact guard (SBA/CGA +1 with WW)  PT - OK to Discharge: Yes (PT evaluation and DC recommendations made)      Subjective   General Visit Information:  General  Reason for Referral: Admitted 11/30 for scheduled surgery for Right hip  pain; dx: AVN Right hip with collapse; 11/30 Right anterior THR  Past Medical History Relevant to Rehab: PMHx: Right hip AVN, smoker  Missed Visit: No  Family/Caregiver Present: No  Prior to Session Communication: Bedside nurse  Patient Position Received:  (stretcher 2 rails up)  Preferred Learning Style: verbal  General Comment: Pt supine on stretcher in PACU, RN cleared, pt a little groggy but participated fully and did well as noted.  Home Living:  Home Living  Type of Home:  (plans to live at mom's house for recovery; [address] with 1 rail, bed and tub combo (no equipment) on 1st floor)  Lives With:  (mom to help (retired, previous medical personnel at [employer]) as needed)  Home Adaptive Equipment:  (just issued wheeled walker)  Prior Level of Function:  Prior Function Per Pt/Caregiver Report  Level of [place]:  (independent ambulation and stairs no device, doesn't drive, no falls)  Vocational:  (not working, not on disability, not driving)  Hand Dominance: Right  Precautions:  Precautions  Medical Precautions: Fall precautions  Post-Surgical Precautions:  (Right LE WBAT, no hip precautions)  Vital Signs:  Vital Signs  Heart Rate:  (supine pre at rest: /68  HR 73 O2 98%; supine post gait:  /81 HR 71 O2 97)    Objective   Pain:  Pain Assessment  Pain Assessment: 0-10  Pain Score:  (9/10 Right hip area, greatest anteriorly)  Pain Type: Acute pain, Surgical pain  Pain Interventions: Cold pack, Repositioned, Ambulation/increased activity  Response to Interventions: same pain level, RN aware (he can't get any more pain meds for now)  Cognition:  Cognition  Overall Cognitive Status: Within Functional Limits  Orientation Level: Oriented X4    General Assessments:  General Observation  General Observation: supine a little groggy but participating fully             Activity Tolerance  Endurance:  (tolerated all necessary mobility without significant rest)    Sensation  Light Touch: No apparent deficits (reports  numbness Right foot previously)    Postural Control  Postural Control: Within Functional Limits    Static Sitting Balance  Static Sitting-Balance Support: Feet supported, No upper extremity supported  Static Sitting-Level of Assistance: Close supervision    Static Standing Balance  Static Standing-Balance Support: Bilateral upper extremity supported (on WW, WBAT Right LE)  Static Standing-Level of Assistance: Close supervision  Dynamic Standing Balance  Dynamic Standing-Balance Support: Bilateral upper extremity supported (on WW)  Dynamic Standing-Balance:  (gait including turns)  Dynamic Standing-Comments: CGA/cues  Functional Assessments:  Bed Mobility  Bed Mobility: Yes  Bed Mobility 1  Bed Mobility 1: Supine to sitting, Sitting to supine  Level of Assistance 1: Minimum assistance, Minimal verbal cues, Minimal tactile cues (CGA to sit up, min A with LE to get back (in/out on Left side of stretcher, 1 rail up, HOB elevated 30 degrees))    Transfers  Transfer: Yes  Transfer 1  Transfer From 1: Sit to, Stand to  Transfer to 1: Sit, Stand  Technique 1: Sit to stand, Stand to sit  Transfer Device 1: Walker (WW (fit for pt, issued by ortho prior to eval))  Transfer Level of Assistance 1: Minimal verbal cues, Minimal tactile cues, Contact guard  Transfers 2  Transfer From 2: Stand to  Transfer to 2:  (sit on edge of stretcher)  Technique 2: Stand pivot  Transfer Device 2: Walker (WW)  Transfer Level of Assistance 2: Contact guard, Minimal verbal cues, Minimal tactile cues    Ambulation/Gait Training  Ambulation/Gait Training Performed: Yes  Ambulation/Gait Training 1  Surface 1: Level tile  Device 1: Rolling walker  Assistance 1: Contact guard, Minimal verbal cues, Minimal tactile cues  Quality of Gait 1:  (slow, small steps, antalgic Right LE WB, cues for sequencing and turns as well as position of body relative to walker)  Comments/Distance (ft) 1: 40    Stairs  Stairs: Yes  Stairs  Rails 1: Left (up/down 2 steps x 2  with Left rail, 1 step at a time with CGA)  Extremity/Trunk Assessments:  RUE   RUE : Within Functional Limits (ROM)  LUE   LUE: Within Functional Limits (ROM)  RLE   RLE : Within Functional Limits (ankle/knee, hip flexion >95)  LLE   LLE : Within Functional Limits (ROM)  Outcome Measures:  [facility] Basic Mobility  Turning from your back to your side while in a flat bed without using bedrails: A little  Moving from lying on your back to sitting on the side of a flat bed without using bedrails: A little  Moving to and from bed to chair (including a wheelchair): A little  Standing up from a chair using your arms (e.g. wheelchair or bedside chair): A little  To walk in hospital room: A little  Climbing 3-5 steps with railing: A little  Basic Mobility - Total Score: 18    Encounter Problems       Encounter Problems (Active)       General Goals       independent with HEP (Progressing)       Start:  11/30/23    Expected End:  12/07/23            static sit EOB no UE support independently (Progressing)       Start:  11/30/23    Expected End:  12/07/23            supine to/from sit (HOB flat, no rail) independently (Progressing)       Start:  11/30/23    Expected End:  12/07/23               Mobility       sit to/from stand, bed to/from chair with LRAD, WBAT Right LE modified independent (Progressing)       Start:  11/30/23    Expected End:  12/07/23            ambulate 500' WBAT Right LE with LRAD modified independent (Progressing)       Start:  11/30/23    Expected End:  12/07/23            up/down 12 steps with 1 rail/cane WBAT Right LE with supervision (Progressing)       Start:  11/30/23    Expected End:  12/07/23                   Education Documentation  Handouts, taught by Ira Holden PT at 11/30/2023  2:41 PM.  Learner: Patient  Readiness: Eager  Method: Explanation, Demonstration  Response: Verbalizes Understanding, Demonstrated Understanding  Comment: no precautions, WBAT Right LE, safe gait/transfers, home  PT, exercises, vitals    Precautions, taught by Ira Holden PT at 11/30/2023  2:41 PM.  Learner: Patient  Readiness: Eager  Method: Explanation, Demonstration  Response: Verbalizes Understanding, Demonstrated Understanding  Comment: no precautions, WBAT Right LE, safe gait/transfers, home PT, exercises, vitals    Home Exercise Program, taught by Ira Holden PT at 11/30/2023  2:41 PM.  Learner: Patient  Readiness: Eager  Method: Explanation, Demonstration  Response: Verbalizes Understanding, Demonstrated Understanding  Comment: no precautions, WBAT Right LE, safe gait/transfers, home PT, exercises, vitals    Mobility Training, taught by Ira Holden PT at 11/30/2023  2:41 PM.  Learner: Patient  Readiness: Eager  Method: Explanation, Demonstration  Response: Verbalizes Understanding, Demonstrated Understanding  Comment: no precautions, WBAT Right LE, safe gait/transfers, home PT, exercises, vitals    Education Comments  No comments found.

## 2023-11-30 NOTE — ANESTHESIA PROCEDURE NOTES
Airway  Date/Time: 11/30/2023 7:34 AM  Urgency: elective      Staffing  Performed: [facility]   Authorized by: Markel Taylor MD    Performed by: JOSE MARTIN Walker  Patient location during procedure: OR    Indications and Patient Condition  Indications for airway management: anesthesia  Spontaneous ventilation: present  Sedation level: moderate (conscious sedation)      Final Airway Details  Final airway type: mask (SFM)

## 2023-11-30 NOTE — DISCHARGE INSTRUCTIONS
JOINT CARE TEAM  Please use the information below to contact your care team following surgery.  If you are leaving a message, please include your full name, date of birth and date of surgery so that we can correctly identify you.  Your call will be returned within 1-2 business days, please do not leave multiple messages regarding a single issue while you are awaiting a return call.     Who to call Contact Information Matters needing handled    Inessa MENJIVAR, RN   Ortho/Trauma Nurse Navigator    [phone]   Prescription Refills   Orders for dental antibiotics lifelong  Nursing, medical question related questions or concerns within 6 weeks of surgery   Orders for Outpatient Physical Therapy          [place]            [phone] [address]     Scheduling office Visits  Leave of Absence or other paperwork  Any concerns more than 6 weeks from surgery - an appointment will need to be made     MEDICATION REFILLS - TIARA Rahman, RN (MyChart or Main Office)    You will not receive a call indicating that your prescription has been filled.  Please contact your pharmacy with any questions.    Medication refills will be filled Monday-Friday 7am to 1pm ONLY. Please call the office or send a [facility] message for a refill request.  Any requests received outside of this timeframe will be handled on the next business day.  Messages should be left directly through the office or via my chart.  Please do not call multiple times or call other members of the care team for medication needs, this will cause the refill to take longer.    Per State and Institutional policy, pain medications can only be refilled every 7 days for up to six weeks following surgery.    ICE & COLD THERAPY INSTRUCTIONS    To assist with pain control and post-op swelling, you should be using ice regularly throughout recovery, especially for the first 6 weeks, regardless of the cold therapy method you use.      Always make sure there is a  layer of protection between the cold pad and your skin.    DISCHARGE MEDICATIONS - Please reference the sample schedule on the reverse side for instructions on how to best schedule medications.  PAIN MEDICATION    ___X___ Tramadol / Oxycodone  Tramadol and Oxycodone have been prescribed for post-operative pain control.    These medications will only be refilled ONCE every 7 days for a period of up to 6 weeks following surgery.  After 6 weeks, you will transition to acetaminophen and over -the- counter anti-inflammatories such as Ibuprofen, Advil or Aleve in conjunction with ICE/COLD THERAPY.   Side effects may be constipation and nausea, vomiting, sleepiness, dizziness, lightheadedness, headache, blurred vision, dry mouth sweating, itching (if you have itching, over-the -counter Benadryl can be used as needed).  You may NOT operate a motor vehicle while taking these medications or have been cleared by your care team.     ___ X___Acetaminophen (Tylenol)  Acetaminophen has been prescribed as an adjunct for pain control. Take two 500 mg tablets every 6 hours for 4 weeks. You will not receive a refill on this medication.  Do not exceed 4000mg of acetaminophen within a 24 hour period.  Side effects may include nausea, heartburn, drowsiness, and headache.    _______ Meloxicam (Mobic)-Meloxicam has been prescribed as an adjunct anti-inflammatory to assist in pain control.    Take one 15mg tablet once daily for 4 weeks.  You will not receive refills on this medication.   Side effects may include nausea.  May not be prescribed if you are on a more potent blood thinner than aspirin or have chronic kidney disease.    BLOOD THINNER    ___X ___ Blood Thinner  or Resume prescribed medication  A blood thinner has been prescribed to prevent blood clots in your leg or lungs. Take as prescribed on the bottle for 4 weeks. You will not receive a refill on this medication.    ANTI NAUSEA    ______Pantoprazole (Protonix)  Pantoprazole  has been prescribed to help with nausea and protect your stomach while taking pain medication. Take one 40 mg tablet once daily for 4 weeks. You will not receive a refill on this medication.    ______ Zofran   Zofran has been prescribed to help with nausea and protect your stomach while taking pain medication. Take one 4 mg tablet every eight hours as needed for nausea. Refills will be provided as necessary.    STOOL SOFTENERS    ___ X___Colace (Docusate Sodium) & Miralax (polyethylene glycol)  Take both medications to help with constipation while using the Oxycodone and Tramadol for pain control.  You will not receive a refill on this medication.    ______ Senna  Senna has been prescribed to help with constipation while on Oxycodone and Tramadol. Take one tab, once daily. Senna is a laxative used to treat constipation.  Side effects may include nausea, vomiting, persistent diarrhea, abdominal cramps, and discolored urine.      You will not receive refills on the following medications:  Acetaminophen (Tylenol)  Meloxicam  Miralax  Colace  Pantoprazole  Blood Thinner    Pain Medication Refills [phone] or [facility]- Monday through Friday 7am-1pm    Medication refills will be sent upon receipt of your request during the times listed above. Due to the high call volume, you will not receive a call confirming prescription refills; please do not call multiple times.  Prescription refills may take a few hours to process, you may follow up with your pharmacy for pickup availability.    SAMPLE              The times below are an example of how to organize medications to optimize pain control      Time 3:00 am 6:00 am 9:00 am 12:00 pm 3:00 pm 6:00 pm 9:00 pm 12:00 am   Medications Tramadol Tylenol  Oxycodone  Miralax   Blood Thinner  Colace  Pantoprazole  Tramadol  Meloxicam Tylenol  Oxycodone Tramadol Tylenol  Oxycodone  Miralax Blood Thinner  Colace  Tramadol   Tylenol  Oxycodone            You may begin to wean off the  pain medication as your pain remains controlled with increased activity.  The schedules provided are meant to serve as an example.  You may wean off based on your pain control.  Please note that pain medications are not filled beyond 6 weeks after surgery.              The times below are an example of how to WEAN OFF medications WHILE CONTINUING TO OPTIMIZE PAIN CONTROL.  Your actual medication schedule may vary based on your last dose taken.    Time 12:00am 4:00am 8:00am 12:00pm 4:00pm 8:00pm   Med Tramadol Oxycodone   Tramadol Oxycodone Tramadol Oxycodone     Time 12:00am 6:00am 12:00pm 6:00pm   Med Tramadol Oxycodone   Tramadol Oxycodone     Time 12:00am 8:00am 4:00pm   Med Tramadol Oxycodone   Tramadol     Time 12:00am 12:00pm   Med Tramadol Tramadol            Weight Bearing Instructions:  You may be weight-bearing as tolerated on your Right Leg at all times. You have no restrictions to range of motion.     Call your provider if:   Call if any drainage after 7 days, increased redness/warmth/swelling at incision site, pain/tenderness of calf, swelling of calf that does not respond to elevation, SOB/chest pain.    Dressing instructions:   Remove operative dressing from incision 7 days after surgery.  (May remove 1 day earlier or later depending on homecare nursing visit).  Wound may be open to air when dry. If there is continued drainage, cover with an abdominal pad and ACE wrap.  If the operative dressing was changed prior to 7 days post op, then remove the dressing 7 days from the time it was placed.    Medications:  You were given opioid pain medication for a short period. Please wean off of this as possible. You may take Tylenol as prescribed, avoid tylenol if you were given Norco or Percocet.     You were given Aspirin twice a day for DVT prophylaxis to prevent blood clots. Please take this medication as scheduled until complete.     You were given colace for constipation. Please take as needed.     Follow  up:  Please call to schedule a follow-up appointment with Dr. Aguayo in 2-3 weeks.

## 2023-12-01 ENCOUNTER — HOME CARE VISIT (OUTPATIENT)
Dept: HOME HEALTH SERVICES | Facility: HOME HEALTH | Age: 33
End: 2023-12-01
Payer: MEDICAID

## 2023-12-01 VITALS
TEMPERATURE: 98.8 F | DIASTOLIC BLOOD PRESSURE: 76 MMHG | HEART RATE: 92 BPM | RESPIRATION RATE: 13 BRPM | SYSTOLIC BLOOD PRESSURE: 114 MMHG

## 2023-12-01 PROCEDURE — G0151 HHCP-SERV OF PT,EA 15 MIN: HCPCS

## 2023-12-01 PROCEDURE — 0023 HH SOC

## 2023-12-01 SDOH — HEALTH STABILITY: PHYSICAL HEALTH
EXERCISE COMMENTS: ASSIGNED AP, HEEL SLIDES OR MARCHES ROM ACTIVITIES AND WALK EVERY HOUR WHILE AWAKE AS TOLERATED.  ALSO INSTRUCTED UNSUPPORTED L KNEE EXTENSION 3-4 TIMES A DAY AS TOLERATED AS HEP.  PATIENT VOICED UNDERSTANDING.  HANDOUT PROVIDED.

## 2023-12-01 ASSESSMENT — ENCOUNTER SYMPTOMS
HIGHEST PAIN SEVERITY IN PAST 24 HOURS: 9/10
PAIN SEVERITY GOAL: 0/10
PAIN: 1
PAIN LOCATION - PAIN SEVERITY: 8/10
PAIN LOCATION: RIGHT HIP
LOWEST PAIN SEVERITY IN PAST 24 HOURS: 4/10
MUSCLE WEAKNESS: 1
LOSS OF SENSATION IN FEET: 0
SUBJECTIVE PAIN PROGRESSION: WAXING AND WANING
PAIN LOCATION - PAIN FREQUENCY: CONSTANT
DEPRESSION: 0
OCCASIONAL FEELINGS OF UNSTEADINESS: 1
LIMITED RANGE OF MOTION: 1
PERSON REPORTING PAIN: PATIENT
PAIN LOCATION - PAIN QUALITY: ACHE

## 2023-12-01 ASSESSMENT — ACTIVITIES OF DAILY LIVING (ADL)
OASIS_M1830: 03
AMBULATION ASSISTANCE ON FLAT SURFACES: 1
ENTERING_EXITING_HOME: NEEDS ASSISTANCE
PHYSICAL TRANSFERS ASSESSED: 1
AMBULATION ASSISTANCE: STAND BY ASSIST
CURRENT_FUNCTION: INDEPENDENT
AMBULATION ASSISTANCE: 1

## 2023-12-05 ENCOUNTER — HOME CARE VISIT (OUTPATIENT)
Dept: HOME HEALTH SERVICES | Facility: HOME HEALTH | Age: 33
End: 2023-12-05
Payer: MEDICAID

## 2023-12-05 PROCEDURE — G0157 HHC PT ASSISTANT EA 15: HCPCS

## 2023-12-05 SDOH — HEALTH STABILITY: PHYSICAL HEALTH: PHYSICAL EXERCISE: SUPINE

## 2023-12-05 SDOH — HEALTH STABILITY: PHYSICAL HEALTH: PHYSICAL EXERCISE: 6

## 2023-12-05 SDOH — HEALTH STABILITY: PHYSICAL HEALTH: EXERCISE ACTIVITY: QUAD SETS

## 2023-12-05 SDOH — HEALTH STABILITY: PHYSICAL HEALTH: PHYSICAL EXERCISE: SEATED

## 2023-12-05 SDOH — HEALTH STABILITY: PHYSICAL HEALTH: EXERCISE ACTIVITY: MARCHING IN PLACE

## 2023-12-05 SDOH — HEALTH STABILITY: PHYSICAL HEALTH: EXERCISE ACTIVITY: HIP ABDUCTION

## 2023-12-05 SDOH — HEALTH STABILITY: PHYSICAL HEALTH
EXERCISE COMMENTS: PATIENT EXPRESSED MILD DISCOMFORT WITH SOME EXERCISES. PATIENT WAS ENCOURAGED TO PERFORM AS TOLERATED. PATIENT ADVISED TO AMBULATE EVERY HOUR THAT HE IS AWAKE.

## 2023-12-05 SDOH — HEALTH STABILITY: PHYSICAL HEALTH: EXERCISE ACTIVITY: HEEL SLIDES

## 2023-12-05 SDOH — HEALTH STABILITY: PHYSICAL HEALTH: EXERCISE ACTIVITY: ANKLE PUMPS

## 2023-12-05 SDOH — HEALTH STABILITY: PHYSICAL HEALTH: EXERCISE ACTIVITY: LAQ

## 2023-12-05 ASSESSMENT — ACTIVITIES OF DAILY LIVING (ADL)
AMBULATION ASSISTANCE ON FLAT SURFACES: 1
AMBULATION_DISTANCE/DURATION_TOLERATED: 75 FEET X 2

## 2023-12-05 ASSESSMENT — ENCOUNTER SYMPTOMS
SUBJECTIVE PAIN PROGRESSION: UNCHANGED
PERSON REPORTING PAIN: PATIENT
PAIN LOCATION - PAIN FREQUENCY: CONSTANT
PAIN LOCATION - PAIN QUALITY: ACHING, STIFF
PAIN LOCATION: RIGHT HIP
PAIN LOCATION - PAIN SEVERITY: 8/10
PAIN: 1

## 2023-12-08 ENCOUNTER — HOME CARE VISIT (OUTPATIENT)
Dept: HOME HEALTH SERVICES | Facility: HOME HEALTH | Age: 33
End: 2023-12-08
Payer: MEDICAID

## 2023-12-08 VITALS — OXYGEN SATURATION: 100 %

## 2023-12-08 PROCEDURE — G0157 HHC PT ASSISTANT EA 15: HCPCS

## 2023-12-08 SDOH — HEALTH STABILITY: PHYSICAL HEALTH: EXERCISE ACTIVITY: MINI SQUATS

## 2023-12-08 SDOH — HEALTH STABILITY: PHYSICAL HEALTH: PHYSICAL EXERCISE: STANDING

## 2023-12-08 SDOH — HEALTH STABILITY: PHYSICAL HEALTH: PHYSICAL EXERCISE: 5

## 2023-12-08 SDOH — HEALTH STABILITY: PHYSICAL HEALTH: EXERCISE COMMENTS: PATIENT'S EXERCISE TOLERANCE WAS LIMITED TODAY DUE TO HIGH PAIN LEVELS AND FATIGUE.

## 2023-12-08 SDOH — HEALTH STABILITY: PHYSICAL HEALTH: EXERCISE ACTIVITY: HIP ABDUCTION

## 2023-12-08 SDOH — HEALTH STABILITY: PHYSICAL HEALTH: EXERCISE ACTIVITY: MARCHING IN PLACE

## 2023-12-08 SDOH — HEALTH STABILITY: PHYSICAL HEALTH: EXERCISE ACTIVITY: HEEL/TOE RAISES

## 2023-12-08 SDOH — HEALTH STABILITY: PHYSICAL HEALTH: EXERCISE ACTIVITY: HEEL SLIDES

## 2023-12-08 SDOH — HEALTH STABILITY: PHYSICAL HEALTH: EXERCISE ACTIVITY: HAMSTRING CURLS

## 2023-12-08 SDOH — HEALTH STABILITY: PHYSICAL HEALTH: PHYSICAL EXERCISE: SUPINE

## 2023-12-08 ASSESSMENT — ENCOUNTER SYMPTOMS
PAIN: 1
PAIN LOCATION: RIGHT HIP
PAIN LOCATION - PAIN FREQUENCY: CONSTANT
PAIN LOCATION - PAIN QUALITY: CRAMPING
SUBJECTIVE PAIN PROGRESSION: UNCHANGED
PERSON REPORTING PAIN: PATIENT
PAIN LOCATION - PAIN SEVERITY: 8/10

## 2023-12-08 ASSESSMENT — ACTIVITIES OF DAILY LIVING (ADL)
AMBULATION ASSISTANCE ON FLAT SURFACES: 1
AMBULATION_DISTANCE/DURATION_TOLERATED: 50 FEET

## 2023-12-11 ENCOUNTER — HOME CARE VISIT (OUTPATIENT)
Dept: HOME HEALTH SERVICES | Facility: HOME HEALTH | Age: 33
End: 2023-12-11
Payer: MEDICAID

## 2023-12-11 PROCEDURE — G0152 HHCP-SERV OF OT,EA 15 MIN: HCPCS

## 2023-12-12 ENCOUNTER — HOME CARE VISIT (OUTPATIENT)
Dept: HOME HEALTH SERVICES | Facility: HOME HEALTH | Age: 33
End: 2023-12-12
Payer: MEDICAID

## 2023-12-12 VITALS
HEART RATE: 68 BPM | DIASTOLIC BLOOD PRESSURE: 78 MMHG | TEMPERATURE: 98.7 F | OXYGEN SATURATION: 98 % | RESPIRATION RATE: 16 BRPM | SYSTOLIC BLOOD PRESSURE: 126 MMHG

## 2023-12-12 ASSESSMENT — ENCOUNTER SYMPTOMS
PAIN LOCATION: LEFT LEG
SUBJECTIVE PAIN PROGRESSION: GRADUALLY IMPROVING
HIGHEST PAIN SEVERITY IN PAST 24 HOURS: 7/10
PAIN: 1
PAIN SEVERITY GOAL: 0/10
LOWEST PAIN SEVERITY IN PAST 24 HOURS: 0/10

## 2023-12-12 ASSESSMENT — ACTIVITIES OF DAILY LIVING (ADL)
BATHING_CURRENT_FUNCTION: STAND BY ASSIST
PHYSICAL TRANSFERS ASSESSED: 1
GROOMING_CURRENT_FUNCTION: STAND BY ASSIST
CURRENT_FUNCTION: STAND BY ASSIST
GROOMING ASSESSED: 1
BATHING ASSESSED: 1

## 2023-12-12 ASSESSMENT — PAIN SCALES - PAIN ASSESSMENT IN ADVANCED DEMENTIA (PAINAD)
BODYLANGUAGE: 0
NEGVOCALIZATION: 0 - NONE.
BREATHING: 0
FACIALEXPRESSION: 0 - SMILING OR INEXPRESSIVE.
BODYLANGUAGE: 0 - RELAXED.
FACIALEXPRESSION: 0
NEGVOCALIZATION: 0

## 2023-12-12 NOTE — HOME HEALTH
OCCUPATIONAL THERAPY EVALAUTION COMPLETED, AND PRESENT WITH RIGHT HIP REPLACEMENT SURGERY. PATIENT ABLE TO COMPLETE PERSONAL CARE, DRESSING AND SPONGE, SET UP CLEAN UP PHYSICAL ASSISTANCE. PATIENT PRESENT WITH CAREGIVER, NOT PRESENT, BUT SUPPORTIVE.

## 2023-12-13 ENCOUNTER — HOME CARE VISIT (OUTPATIENT)
Dept: HOME HEALTH SERVICES | Facility: HOME HEALTH | Age: 33
End: 2023-12-13
Payer: MEDICAID

## 2023-12-13 PROCEDURE — G0180 MD CERTIFICATION HHA PATIENT: HCPCS | Performed by: ORTHOPAEDIC SURGERY

## 2023-12-15 ENCOUNTER — APPOINTMENT (OUTPATIENT)
Dept: HOME HEALTH SERVICES | Facility: HOME HEALTH | Age: 33
End: 2023-12-15
Payer: MEDICAID

## 2023-12-20 ENCOUNTER — HOME CARE VISIT (OUTPATIENT)
Dept: HOME HEALTH SERVICES | Facility: HOME HEALTH | Age: 33
End: 2023-12-20
Payer: MEDICAID

## 2023-12-20 VITALS
HEART RATE: 67 BPM | RESPIRATION RATE: 16 BRPM | OXYGEN SATURATION: 97 % | DIASTOLIC BLOOD PRESSURE: 60 MMHG | SYSTOLIC BLOOD PRESSURE: 96 MMHG

## 2023-12-20 PROCEDURE — G0157 HHC PT ASSISTANT EA 15: HCPCS

## 2023-12-20 SDOH — HEALTH STABILITY: PHYSICAL HEALTH
EXERCISE COMMENTS: PATIENT PERFORMED HIGH REPITIONS OF ALL STANDING AND SEATED THERAPEUTIC EXERCISES WITH NO ADVERSE RESPONSE.

## 2023-12-20 ASSESSMENT — ENCOUNTER SYMPTOMS
PAIN SEVERITY GOAL: 0/10
PAIN LOCATION - PAIN FREQUENCY: INTERMITTENT
PERSON REPORTING PAIN: PATIENT
HIGHEST PAIN SEVERITY IN PAST 24 HOURS: 7/10
SUBJECTIVE PAIN PROGRESSION: GRADUALLY IMPROVING
PAIN LOCATION - PAIN QUALITY: ACHE
LOWEST PAIN SEVERITY IN PAST 24 HOURS: 3/10
PAIN LOCATION - PAIN SEVERITY: 5/10
PAIN: 1
PAIN LOCATION: RIGHT HIP

## 2023-12-20 ASSESSMENT — ACTIVITIES OF DAILY LIVING (ADL)
AMBULATION_DISTANCE/DURATION_TOLERATED: 100 FEET
AMBULATION ASSISTANCE ON FLAT SURFACES: 1

## 2023-12-22 ENCOUNTER — APPOINTMENT (OUTPATIENT)
Dept: HOME HEALTH SERVICES | Facility: HOME HEALTH | Age: 33
End: 2023-12-22
Payer: MEDICAID

## 2023-12-22 ENCOUNTER — HOME CARE VISIT (OUTPATIENT)
Dept: HOME HEALTH SERVICES | Facility: HOME HEALTH | Age: 33
End: 2023-12-22
Payer: MEDICAID

## 2023-12-27 ENCOUNTER — HOSPITAL ENCOUNTER (EMERGENCY)
Facility: HOSPITAL | Age: 33
Discharge: HOME | End: 2023-12-27
Payer: MEDICAID

## 2023-12-27 ENCOUNTER — HOME CARE VISIT (OUTPATIENT)
Dept: HOME HEALTH SERVICES | Facility: HOME HEALTH | Age: 33
End: 2023-12-27
Payer: MEDICAID

## 2023-12-27 VITALS
DIASTOLIC BLOOD PRESSURE: 75 MMHG | OXYGEN SATURATION: 98 % | BODY MASS INDEX: 25.77 KG/M2 | WEIGHT: 180 LBS | TEMPERATURE: 97.7 F | SYSTOLIC BLOOD PRESSURE: 109 MMHG | HEIGHT: 70 IN | RESPIRATION RATE: 16 BRPM | HEART RATE: 78 BPM

## 2023-12-27 DIAGNOSIS — Z20.2 EXPOSURE TO SEXUALLY TRANSMITTED DISEASE (STD): Primary | ICD-10-CM

## 2023-12-27 PROBLEM — B00.9 HERPES SIMPLEX VIRUS (HSV) INFECTION: Status: ACTIVE | Noted: 2023-01-10

## 2023-12-27 PROBLEM — F11.20 OPIOID USE DISORDER, MODERATE, DEPENDENCE (MULTI): Status: ACTIVE | Noted: 2023-03-31

## 2023-12-27 PROBLEM — F43.10 PTSD (POST-TRAUMATIC STRESS DISORDER): Status: ACTIVE | Noted: 2023-03-01

## 2023-12-27 PROBLEM — F16.20: Status: ACTIVE | Noted: 2023-03-01

## 2023-12-27 PROBLEM — F12.20 CANNABIS USE DISORDER, SEVERE, DEPENDENCE (MULTI): Status: ACTIVE | Noted: 2023-03-01

## 2023-12-27 PROBLEM — F10.20 ALCOHOL USE DISORDER, SEVERE, DEPENDENCE (MULTI): Status: ACTIVE | Noted: 2023-03-01

## 2023-12-27 PROBLEM — F14.20 COCAINE USE DISORDER, MODERATE, DEPENDENCE (MULTI): Status: ACTIVE | Noted: 2023-03-01

## 2023-12-27 LAB
C TRACH RRNA SPEC QL NAA+PROBE: NEGATIVE
N GONORRHOEA DNA SPEC QL PROBE+SIG AMP: NEGATIVE
T VAGINALIS RRNA SPEC QL NAA+PROBE: NEGATIVE

## 2023-12-27 PROCEDURE — 87661 TRICHOMONAS VAGINALIS AMPLIF: CPT | Mod: 59

## 2023-12-27 PROCEDURE — 2500000004 HC RX 250 GENERAL PHARMACY W/ HCPCS (ALT 636 FOR OP/ED): Mod: SE

## 2023-12-27 PROCEDURE — 99284 EMERGENCY DEPT VISIT MOD MDM: CPT

## 2023-12-27 PROCEDURE — 99283 EMERGENCY DEPT VISIT LOW MDM: CPT | Mod: 25

## 2023-12-27 PROCEDURE — 2500000001 HC RX 250 WO HCPCS SELF ADMINISTERED DRUGS (ALT 637 FOR MEDICARE OP): Mod: SE

## 2023-12-27 PROCEDURE — 96372 THER/PROPH/DIAG INJ SC/IM: CPT

## 2023-12-27 PROCEDURE — 87800 DETECT AGNT MULT DNA DIREC: CPT

## 2023-12-27 RX ORDER — DOXYCYCLINE HYCLATE 100 MG
100 TABLET ORAL ONCE
Status: COMPLETED | OUTPATIENT
Start: 2023-12-27 | End: 2023-12-27

## 2023-12-27 RX ORDER — METRONIDAZOLE 500 MG/1
2000 TABLET ORAL ONCE
Status: COMPLETED | OUTPATIENT
Start: 2023-12-27 | End: 2023-12-27

## 2023-12-27 RX ORDER — DOXYCYCLINE 100 MG/1
100 CAPSULE ORAL 2 TIMES DAILY
Qty: 14 CAPSULE | Refills: 0 | Status: SHIPPED | OUTPATIENT
Start: 2023-12-27 | End: 2024-01-03

## 2023-12-27 RX ORDER — CEFTRIAXONE 500 MG/1
500 INJECTION, POWDER, FOR SOLUTION INTRAMUSCULAR; INTRAVENOUS ONCE
Status: COMPLETED | OUTPATIENT
Start: 2023-12-27 | End: 2023-12-27

## 2023-12-27 RX ADMIN — DOXYCYCLINE HYCLATE 100 MG: 100 TABLET, FILM COATED ORAL at 11:31

## 2023-12-27 RX ADMIN — METRONIDAZOLE 2000 MG: 500 TABLET ORAL at 11:30

## 2023-12-27 RX ADMIN — CEFTRIAXONE SODIUM 500 MG: 500 INJECTION, POWDER, FOR SOLUTION INTRAMUSCULAR; INTRAVENOUS at 11:31

## 2023-12-27 ASSESSMENT — COLUMBIA-SUICIDE SEVERITY RATING SCALE - C-SSRS
1. IN THE PAST MONTH, HAVE YOU WISHED YOU WERE DEAD OR WISHED YOU COULD GO TO SLEEP AND NOT WAKE UP?: NO
2. HAVE YOU ACTUALLY HAD ANY THOUGHTS OF KILLING YOURSELF?: NO
6. HAVE YOU EVER DONE ANYTHING, STARTED TO DO ANYTHING, OR PREPARED TO DO ANYTHING TO END YOUR LIFE?: NO

## 2023-12-27 NOTE — DISCHARGE INSTRUCTIONS
We are testing you here in the ER for gonorrhea, chlamydia and trichomonas.  None of these results will come back today but we will call you with the results regardless.  We are treating you for all 3 STDs.  For gonorrhea and trichomonas, it is a one-time dose that you were given here in the ER.  For chlamydia, it is a medication called doxycycline that you take twice a day until you completely finish the bottle.  Until you finish the bottle, refrain from sexual intercourse.  Do not have sex with anyone even if you are using a condom.  Let all of your sexual partners know if you have not already that you were exposed to an STD.

## 2023-12-27 NOTE — ED PROVIDER NOTES
"HPI   Chief Complaint   Patient presents with    Exposure to STD       This patient is a 33-year-old male with past medical history of AVN of his hips, substance abuse disorder, alcohol abuse disorder presenting today for concerns of STD.  Reports that for the past 3 days, he has had penile discharge.  Denies any dysuria, fever or chills, abdominal pain or any penile sores or ulcers.  Notes that yesterday, his sexual partner went to clinic to get tested and tested positive for \"some STD\".  He does not know which type of STD she tested positive for.                            Eli Coma Scale Score: 15                  Patient History   Past Medical History:   Diagnosis Date    Joint pain 10/03/2023    Ortho:[facility]     History reviewed. No pertinent surgical history.  Family History   Problem Relation Name Age of Onset    Lung cancer Father       Social History     Tobacco Use    Smoking status: Every Day     Types: Cigars    Smokeless tobacco: Never   Vaping Use    Vaping Use: Never used   Substance Use Topics    Alcohol use: Not Currently    Drug use: Never       Physical Exam   ED Triage Vitals [12/27/23 1112]   Temp Heart Rate Resp BP   36.5 °C (97.7 °F) 78 16 109/75      SpO2 Temp Source Heart Rate Source Patient Position   98 % Temporal -- --      BP Location FiO2 (%)     Right arm --       Physical Exam  Vitals and nursing note reviewed. Chaperone present: Patient declined.   Constitutional:       General: He is not in acute distress.     Appearance: Normal appearance. He is not ill-appearing.   HENT:      Head: Normocephalic and atraumatic.      Right Ear: External ear normal.      Left Ear: External ear normal.      Nose: Nose normal. No congestion or rhinorrhea.      Mouth/Throat:      Mouth: Mucous membranes are moist.      Pharynx: Oropharynx is clear. No oropharyngeal exudate or posterior oropharyngeal erythema.   Eyes:      Extraocular Movements: Extraocular movements intact.      " Conjunctiva/sclera: Conjunctivae normal.      Pupils: Pupils are equal, round, and reactive to light.   Cardiovascular:      Rate and Rhythm: Normal rate and regular rhythm.      Heart sounds: Normal heart sounds.   Pulmonary:      Effort: No accessory muscle usage or respiratory distress.      Breath sounds: Normal breath sounds. No wheezing, rhonchi or rales.   Abdominal:      General: Abdomen is flat. Bowel sounds are normal. There is no distension.      Palpations: Abdomen is soft.      Tenderness: There is no abdominal tenderness. There is no right CVA tenderness or left CVA tenderness.   Musculoskeletal:         General: No swelling or deformity. Normal range of motion.      Cervical back: Normal range of motion and neck supple.      Right lower leg: No edema.      Left lower leg: No edema.   Skin:     General: Skin is warm and dry.      Capillary Refill: Capillary refill takes less than 2 seconds.   Neurological:      General: No focal deficit present.      Mental Status: He is alert and oriented to person, place, and time.      GCS: GCS eye subscore is 4. GCS verbal subscore is 5. GCS motor subscore is 6.      Cranial Nerves: Cranial nerves 2-12 are intact.      Sensory: No sensory deficit.      Motor: Motor function is intact. No weakness.   Psychiatric:         Mood and Affect: Mood and affect normal.         Speech: Speech normal.         Behavior: Behavior normal. Behavior is cooperative.         ED Course & MDM   Diagnoses as of 12/27/23 1129   Exposure to sexually transmitted disease (STD)       Medical Decision Making  Patient is a 33-year-old male presenting today for concerns of STI.  Reports that his sexual partner tested positive yesterday but he is unsure of what STD she tested positive for.  Endorsing a 3-day history of penile discharge.  No other symptoms.  Will get lab work for gonorrhea, trichomonas and chlamydia.  He declined testing for syphilis, HIV or hepatitis C.  Considering he is  symptomatic and had exposure, will treat him with Rocephin, doxycycline and Flagyl.  Per chart review, this patient is noted to have an alcohol use disorder, though he is declining that at this time.  I did inform him of the side effects of Flagyl including disulfiram like reaction.  I informed him that he should refrain from drinking any alcohol for the day.  Giving him a one-time dose of 2000 mg of Flagyl and no other doses needed other than that.  He is understanding of all information and will refrain from drinking alcohol today.  Gave him strict return precautions.  Instructed him to inform all of his sexual partners if he test positive, and told him to refrain from sexual intercourse over the next 7 to 10 days.  He is understanding of all instructions and okay for discharge at this time.        Procedure  Procedures     Monae Ryder PA-C  12/27/23 1132

## 2023-12-29 ENCOUNTER — HOME CARE VISIT (OUTPATIENT)
Dept: HOME HEALTH SERVICES | Facility: HOME HEALTH | Age: 33
End: 2023-12-29
Payer: MEDICAID

## 2023-12-29 ASSESSMENT — ACTIVITIES OF DAILY LIVING (ADL)
HOME_HEALTH_OASIS: 00
OASIS_M1830: 00

## 2023-12-29 ASSESSMENT — ENCOUNTER SYMPTOMS
DEPRESSION: 0
PERSON REPORTING PAIN: PATIENT
DENIES PAIN: 1
OCCASIONAL FEELINGS OF UNSTEADINESS: 0
LOSS OF SENSATION IN FEET: 0

## 2023-12-31 PROBLEM — M25.559 ARTHRALGIA OF HIP: Status: ACTIVE | Noted: 2023-12-31

## 2023-12-31 PROBLEM — F10.20 SEVERE ALCOHOL DEPENDENCE (MULTI): Status: ACTIVE | Noted: 2023-03-01

## 2023-12-31 PROBLEM — F10.129 ALCOHOL ABUSE WITH INTOXICATION (CMS-HCC): Status: ACTIVE | Noted: 2018-07-16

## 2023-12-31 PROBLEM — M25.551 PAIN OF RIGHT HIP JOINT: Status: ACTIVE | Noted: 2022-10-18

## 2023-12-31 PROBLEM — S61.219A LACERATION OF FINGER: Status: ACTIVE | Noted: 2023-01-10

## 2023-12-31 PROBLEM — N34.2 URETHRITIS: Status: ACTIVE | Noted: 2023-01-04

## 2023-12-31 RX ORDER — TRAZODONE HYDROCHLORIDE 150 MG/1
150 TABLET ORAL
COMMUNITY
Start: 2023-09-08

## 2023-12-31 RX ORDER — OXYCODONE HYDROCHLORIDE 5 MG/1
TABLET ORAL EVERY 4 HOURS PRN
COMMUNITY
Start: 2023-11-30

## 2023-12-31 RX ORDER — OMEPRAZOLE 40 MG/1
CAPSULE, DELAYED RELEASE ORAL
COMMUNITY
Start: 2023-08-24

## 2023-12-31 RX ORDER — DOXEPIN HYDROCHLORIDE 25 MG/1
25 CAPSULE ORAL
COMMUNITY
Start: 2023-07-31

## 2023-12-31 RX ORDER — DOXYCYCLINE 100 MG/1
CAPSULE ORAL
COMMUNITY
Start: 2023-01-04 | End: 2024-03-08

## 2024-01-02 ENCOUNTER — HOSPITAL ENCOUNTER (OUTPATIENT)
Dept: RADIOLOGY | Facility: HOSPITAL | Age: 34
Discharge: HOME | End: 2024-01-02
Payer: MEDICAID

## 2024-01-02 ENCOUNTER — OFFICE VISIT (OUTPATIENT)
Dept: ORTHOPEDIC SURGERY | Facility: HOSPITAL | Age: 34
End: 2024-01-02
Payer: MEDICAID

## 2024-01-02 VITALS — HEIGHT: 70 IN | WEIGHT: 185 LBS | BODY MASS INDEX: 26.48 KG/M2

## 2024-01-02 DIAGNOSIS — Z96.641 STATUS POST RIGHT HIP REPLACEMENT: ICD-10-CM

## 2024-01-02 PROCEDURE — 73502 X-RAY EXAM HIP UNI 2-3 VIEWS: CPT | Mod: RIGHT SIDE | Performed by: RADIOLOGY

## 2024-01-02 PROCEDURE — 73502 X-RAY EXAM HIP UNI 2-3 VIEWS: CPT | Mod: RT

## 2024-01-02 PROCEDURE — 99024 POSTOP FOLLOW-UP VISIT: CPT | Performed by: ORTHOPAEDIC SURGERY

## 2024-01-02 ASSESSMENT — PAIN - FUNCTIONAL ASSESSMENT: PAIN_FUNCTIONAL_ASSESSMENT: NO/DENIES PAIN

## 2024-01-02 NOTE — PROGRESS NOTES
Subjective    Patient ID: Norbert Haro is a 34 y.o. male.    Chief Complaint: POV- Right Hip     Last Surgery: Right DA JED  Last Surgery Date: 11/30/2023    HPI  Patient is a 34 y.o. male who is s/p Right DA JED.  Date of surgery was 11/30.  Patient continues to do well at this time and denies issues with incision. Has some tenderness around the incision. Patient continues on ASA for DVT ppx. Patient has finished outpatient physical therapy, but continues to do home exercises at this time. Denies issues with ROM. Has returned to activities as tolerated. Patient denies fever or chills, N/T or calf pain.     ROS: All other systems have been reviewed and are negative except as previously noted in history of present illness.      IMP:  Problem List Items Addressed This Visit    None  Visit Diagnoses       Status post right hip replacement        Relevant Orders    XR hip right with pelvis when performed 2 or 3 views          Objective   General: Alert and oriented x 3, NAD, respirations easy and unlabored with no audible wheezes, skin warm and dry, speech and dress appropriate for noted age, affect euthymic.     Musculoskeletal: Right Lower Extremity  incisions c/d/i  mild swelling to lower leg  compartments soft  no calf tenderness  sensation intact to light touch  motor intact including TA/GS/EHL  palpable DP/PT pulses 2+     X-ray: Images of right hip reviewed personally by me today and reveal maintenance of alignment of prosthesis with hardware in position and no interval change. No loosening noted. No lucency. Stable appearance.      Assessment/Plan   Encounter Diagnoses:  Status post right hip replacement    PLAN: Patient is s/p Right DA JED. Patient is doing well overall. Has finished PT, but continues to do home exercises. Patient incision is clean, dry, and intact. Has some tenderness which he was educated was normal after surgery. Patient has no issue with ROM. Patient can return to activities as  tolerated. Patient will follow up in 9 weeks. Patient is in agreement with this plan. Xrays of right hip will be needed at next visit.     Orders Placed This Encounter    XR hip right with pelvis when performed 2 or 3 views

## 2024-03-05 ENCOUNTER — HOSPITAL ENCOUNTER (OUTPATIENT)
Facility: HOSPITAL | Age: 34
Setting detail: OUTPATIENT SURGERY
End: 2024-03-05
Attending: ORTHOPAEDIC SURGERY | Admitting: ORTHOPAEDIC SURGERY
Payer: MEDICAID

## 2024-03-05 ENCOUNTER — OFFICE VISIT (OUTPATIENT)
Dept: ORTHOPEDIC SURGERY | Facility: HOSPITAL | Age: 34
End: 2024-03-05
Payer: MEDICAID

## 2024-03-05 ENCOUNTER — HOSPITAL ENCOUNTER (OUTPATIENT)
Dept: RADIOLOGY | Facility: HOSPITAL | Age: 34
Discharge: HOME | End: 2024-03-05
Payer: MEDICAID

## 2024-03-05 VITALS — HEIGHT: 70 IN | BODY MASS INDEX: 26.48 KG/M2 | WEIGHT: 185 LBS

## 2024-03-05 DIAGNOSIS — Z96.641 STATUS POST RIGHT HIP REPLACEMENT: Primary | ICD-10-CM

## 2024-03-05 DIAGNOSIS — Z96.641 STATUS POST RIGHT HIP REPLACEMENT: ICD-10-CM

## 2024-03-05 DIAGNOSIS — Z96.641 AFTERCARE FOLLOWING RIGHT HIP JOINT REPLACEMENT SURGERY: ICD-10-CM

## 2024-03-05 DIAGNOSIS — Z47.1 AFTERCARE FOLLOWING RIGHT HIP JOINT REPLACEMENT SURGERY: ICD-10-CM

## 2024-03-05 DIAGNOSIS — M87.052 AVASCULAR NECROSIS OF BONE OF LEFT HIP (MULTI): ICD-10-CM

## 2024-03-05 DIAGNOSIS — M25.552 LEFT HIP PAIN: ICD-10-CM

## 2024-03-05 PROCEDURE — 99214 OFFICE O/P EST MOD 30 MIN: CPT | Performed by: ORTHOPAEDIC SURGERY

## 2024-03-05 PROCEDURE — 73521 X-RAY EXAM HIPS BI 2 VIEWS: CPT

## 2024-03-05 PROCEDURE — 73523 X-RAY EXAM HIPS BI 5/> VIEWS: CPT | Mod: BILATERAL PROCEDURE | Performed by: RADIOLOGY

## 2024-03-05 RX ORDER — ASPIRIN 81 MG/1
TABLET ORAL
COMMUNITY
Start: 2023-11-30

## 2024-03-05 RX ORDER — DOCUSATE SODIUM 100 MG/1
CAPSULE, LIQUID FILLED ORAL
COMMUNITY
Start: 2023-11-30

## 2024-03-05 NOTE — PROGRESS NOTES
Subjective    Patient ID: Norbert Haro is a 34 y.o. male.    Chief Complaint: POV- Right Hip and left hip pain     Last Surgery: Right DA JED  Last Surgery Date: 11/30/2023    HPI  Patient is a 34 y.o. male who is s/p Right DA JED.  Date of surgery was 11/30.  Patient has done extremely well with his right hip.  He has no pain.  He is very pleased with the right hip.  The left hip continues to give him significant amount of pain and functional limitation.  Patient reported in the past he has had injections to the hip without any significant relief.  At this point he would like to proceed with hip replacement surgery on the left.  Denies issues with ROM. Has returned to activities as tolerated. Patient denies fever or chills, N/T or calf pain.     ROS: All other systems have been reviewed and are negative except as previously noted in history of present illness.      IMP:  Problem List Items Addressed This Visit       Avascular necrosis of bone of left hip (CMS/HCC)    Relevant Orders    Case Request Operating Room: Arthroplasty Total Hip Anterior Approach (Completed)    Request for Pre-Admission Testing Visit    Staphylococcus aureus/MRSA colonization, Culture     Other Visit Diagnoses       Status post right hip replacement    -  Primary          Objective   General: Alert and oriented x 3, NAD, respirations easy and unlabored with no audible wheezes, skin warm and dry, speech and dress appropriate for noted age, affect euthymic.     Musculoskeletal: Right Lower Extremity  incisions c/d/i  mild swelling to lower leg  compartments soft  no calf tenderness  sensation intact to light touch  motor intact including TA/GS/EHL  palpable DP/PT pulses 2+     Patient is able to ambulate with a mild antalgic gait on the left.  Examination of the hip reveals no skin abnormalities.  No tenderness to palpation about the hip, knee and thigh area.  Left hip range of motion of the hip reveals flexion past 90 degrees, patient  has full extension, 40 degrees of hip abduction, 30 degrees of abduction, internal rotation to 0 degrees and external rotation to 45 degrees.  There is moderate pain with rotational range of motion of the hip.    X-ray: Images of right hip reviewed personally by me today and reveal maintenance of alignment of prosthesis with hardware in position and no interval change. No loosening noted. No lucency. Stable appearance.    Patient has significant subchondral collapse of the left hip.  There is cystic changes of the femoral head and acetabulum.  Assessment/Plan   Encounter Diagnoses:  Status post right hip replacement    Avascular necrosis of bone of left hip (CMS/HCC)    PLAN: Patient is s/p Right DA JED. Patient is doing well overall.  Patient has no concern at this point as far as the right hip goes.  He has significant improvement in function.  However he persistent pain on the left side.  Patient has left hip avascular necrosis with advanced collapse and secondary osteoarthritis.  This is similar to his right hip.  At this point he would like to pursue hip replacement surgery.  Tentative date is August 14, 2024.  I talked with the patient at length about risks, limitations, benefits and alternatives to total hip replacement today. Under my care or the care of previous providers, the patient has had a reasonable trial of nonoperative treatment for their hip problem including NSAIDS, tylenol  or other analgesics, activity modification and activity restriction and use of assistive devices.  These  previous treatments have not provided the patient with durable relief of their symptoms.The patient is not an appropriate candidate for physical therapy at this time. Despite the above, patient has had pain and symptomatic functional impairment that either interferes with their sleep or ADLs and quality of life. I reviewed concerns about implant wear, loosening, breakage, infection and infection prophylaxis, DVT, PE, death  and other medical and anesthetic complications of surgery. We talked about the potential for persistent pain following surgery since there are many possible causes for hip and leg pain. The patient was advised that hip replacement will only relieve pain that is coming from the hip. We talked about leg length discrepancy, neurovascular problems and dislocation after surgery. The patient understands that we may have to lengthen the leg slightly to provide for adequate stability of the hip. I reviewed dislocation precautions and activity restrictions in detail. We discussed advantages and disadvantages of cemented and cementless implant fixation. We discussed the concerns about intraoperative fracture, ingrowth failure, thigh pain and possible post-operative weight bearing restrictions following cementless hip replacement. We discussed advantages and disadvantages of different surgical approaches. The basic concepts of the joint replacement procedure has been reviewed with the patient and the patient has been provided the opportunity to see an actual implant either in the office or in our pre-op education class. We discussed the possible need for a homologous blood transfusion. We discussed the fact that many of our patients are able to go home as outpatient or 1 -2 days depending on their health, mobility, pre-op preparation, individual home situation and personal preference. The patient should take our pre-operative teaching class. All of the patients questions were answered. The patient can call my office to schedule surgery and the pre-op teaching class. I told the patient that they should contact their primary care physician to discuss fitness for surgery.          Note dictated with [employer] software.  Completed without full type editing and sent to avoid delay.    Orders Placed This Encounter    Enroll patient in anterior total hip replacement care plan    Request for Pre-Admission Testing  Visit    Staphylococcus aureus/MRSA colonization, Culture    Case Request Operating Room: Arthroplasty Total Hip Anterior Approach

## 2024-03-08 ENCOUNTER — HOSPITAL ENCOUNTER (EMERGENCY)
Facility: HOSPITAL | Age: 34
Discharge: HOME | End: 2024-03-08
Attending: EMERGENCY MEDICINE
Payer: MEDICAID

## 2024-03-08 VITALS
OXYGEN SATURATION: 95 % | WEIGHT: 180 LBS | HEART RATE: 80 BPM | HEIGHT: 71 IN | BODY MASS INDEX: 25.2 KG/M2 | TEMPERATURE: 98.1 F | RESPIRATION RATE: 18 BRPM | DIASTOLIC BLOOD PRESSURE: 77 MMHG | SYSTOLIC BLOOD PRESSURE: 117 MMHG

## 2024-03-08 DIAGNOSIS — Z20.2 STD EXPOSURE: Primary | ICD-10-CM

## 2024-03-08 LAB
APPEARANCE UR: CLEAR
BILIRUB UR STRIP.AUTO-MCNC: NEGATIVE MG/DL
COLOR UR: NORMAL
GLUCOSE UR STRIP.AUTO-MCNC: NORMAL MG/DL
HCV AB SER QL: NONREACTIVE
HIV 1+2 AB+HIV1 P24 AG SERPL QL IA: NONREACTIVE
HOLD SPECIMEN: NORMAL
KETONES UR STRIP.AUTO-MCNC: NEGATIVE MG/DL
LEUKOCYTE ESTERASE UR QL STRIP.AUTO: NEGATIVE
NITRITE UR QL STRIP.AUTO: NEGATIVE
PH UR STRIP.AUTO: 6 [PH]
PROT UR STRIP.AUTO-MCNC: NEGATIVE MG/DL
RBC # UR STRIP.AUTO: NEGATIVE /UL
SP GR UR STRIP.AUTO: 1.02
TREPONEMA PALLIDUM IGG+IGM AB [PRESENCE] IN SERUM OR PLASMA BY IMMUNOASSAY: NONREACTIVE
UROBILINOGEN UR STRIP.AUTO-MCNC: NORMAL MG/DL

## 2024-03-08 PROCEDURE — 87800 DETECT AGNT MULT DNA DIREC: CPT | Performed by: EMERGENCY MEDICINE

## 2024-03-08 PROCEDURE — 2500000001 HC RX 250 WO HCPCS SELF ADMINISTERED DRUGS (ALT 637 FOR MEDICARE OP): Mod: SE | Performed by: EMERGENCY MEDICINE

## 2024-03-08 PROCEDURE — 87389 HIV-1 AG W/HIV-1&-2 AB AG IA: CPT | Performed by: EMERGENCY MEDICINE

## 2024-03-08 PROCEDURE — 86780 TREPONEMA PALLIDUM: CPT | Performed by: EMERGENCY MEDICINE

## 2024-03-08 PROCEDURE — 99283 EMERGENCY DEPT VISIT LOW MDM: CPT | Mod: 25

## 2024-03-08 PROCEDURE — 99284 EMERGENCY DEPT VISIT MOD MDM: CPT | Performed by: EMERGENCY MEDICINE

## 2024-03-08 PROCEDURE — 36415 COLL VENOUS BLD VENIPUNCTURE: CPT | Performed by: EMERGENCY MEDICINE

## 2024-03-08 PROCEDURE — 86803 HEPATITIS C AB TEST: CPT | Performed by: PHYSICIAN ASSISTANT

## 2024-03-08 PROCEDURE — 96372 THER/PROPH/DIAG INJ SC/IM: CPT

## 2024-03-08 PROCEDURE — 2500000004 HC RX 250 GENERAL PHARMACY W/ HCPCS (ALT 636 FOR OP/ED): Mod: SE | Performed by: EMERGENCY MEDICINE

## 2024-03-08 PROCEDURE — 81003 URINALYSIS AUTO W/O SCOPE: CPT | Performed by: EMERGENCY MEDICINE

## 2024-03-08 RX ORDER — DOXYCYCLINE 100 MG/1
100 TABLET ORAL 2 TIMES DAILY
Qty: 20 TABLET | Refills: 0 | Status: SHIPPED | OUTPATIENT
Start: 2024-03-08 | End: 2024-03-18

## 2024-03-08 RX ORDER — DOXYCYCLINE HYCLATE 100 MG
100 TABLET ORAL ONCE
Status: COMPLETED | OUTPATIENT
Start: 2024-03-08 | End: 2024-03-08

## 2024-03-08 RX ORDER — CEFTRIAXONE 500 MG/1
500 INJECTION, POWDER, FOR SOLUTION INTRAMUSCULAR; INTRAVENOUS ONCE
Status: COMPLETED | OUTPATIENT
Start: 2024-03-08 | End: 2024-03-08

## 2024-03-08 RX ADMIN — DOXYCYCLINE HYCLATE 100 MG: 100 TABLET, COATED ORAL at 11:47

## 2024-03-08 RX ADMIN — CEFTRIAXONE SODIUM 500 MG: 500 INJECTION, POWDER, FOR SOLUTION INTRAMUSCULAR; INTRAVENOUS at 11:46

## 2024-03-08 ASSESSMENT — COLUMBIA-SUICIDE SEVERITY RATING SCALE - C-SSRS
6. HAVE YOU EVER DONE ANYTHING, STARTED TO DO ANYTHING, OR PREPARED TO DO ANYTHING TO END YOUR LIFE?: NO
1. IN THE PAST MONTH, HAVE YOU WISHED YOU WERE DEAD OR WISHED YOU COULD GO TO SLEEP AND NOT WAKE UP?: NO
2. HAVE YOU ACTUALLY HAD ANY THOUGHTS OF KILLING YOURSELF?: NO

## 2024-03-08 ASSESSMENT — PAIN SCALES - GENERAL: PAINLEVEL_OUTOF10: 7

## 2024-03-08 ASSESSMENT — PAIN - FUNCTIONAL ASSESSMENT: PAIN_FUNCTIONAL_ASSESSMENT: 0-10

## 2024-03-08 NOTE — ED PROVIDER NOTES
Limitations to History: None  Additional History Obtained from: None    HPI:    Patient is presenting to the emergency department due due to concern for disease.  Patient states that he got a phone call from a woman that he had slept with stating that he needed to get checked for STDs.  Denies any fevers or chills.  Had noted dysuria as well as a painless lesion that started yesterday.  Denies any abdominal pain or flank pain.  Denies similar symptoms previously.  Denies history of previous STDs that he is aware of.    ------------------------------------------------------------------------------------------------------------------------------------------  Physical Exam:    ED Triage Vitals [03/08/24 1001]   Temperature Heart Rate Respirations BP   36.7 °C (98.1 °F) 80 18 117/77      Pulse Ox Temp Source Heart Rate Source Patient Position   95 % Temporal Monitor Sitting      BP Location FiO2 (%)     Right arm --        VS: As documented in the triage note and EMR flowsheet from this visit were reviewed.  General: Well appearing. No acute distress.   Eyes: Pupils round and reactive. No scleral icterus. No conjunctival injection  HENT: Atraumatic. Normocephalic. Moist mucous membranes. Trachea midline  CV: RRR, No MRG. No pedal edema appreciated.  Resp: Clear to auscultation bilaterally. Non-labored.    GI: Soft, nontender to palpation. Nondistended. No guarding, rigidity or rebound  : Exam performed with supervision, painless lesion, not erythematous, nonvesicular without testicular pain, swelling noted at the base of penis.  No drainage noted.  Skin: Warm, dry, intact. No systemic rashes or lesions appreciated.  Extremities: No deformities or pain out of proportion; pulses intact   Neuro: Alert. No focal motor or sensory deficits observed. Speech fluent. Answers questions appropriately.   Psych: Appropriate.  Mercedes.    ------------------------------------------------------------------------------------------------------------------------------------------    Medical Decision Making  Patient is presenting to the emergency department for an STD check.  Will obtain a urinalysis due to the patient's dysuria, provide supportive treatment with empiric treatment, community care coordination as well as outpatient follow-up.  Patient hemodynamically stable without high risk features otherwise.  Patient given return precautions as well as instructions to refrain from sexual activity until treatment is complete.      External Records Reviewed: I reviewed recent and relevant outside records including: HIE/Community Record  Escalation of Care: Appropriate for Discharge per ED course/MDM  Social Determinants Affecting Care:Not applicable  Prescription Drug Consideration: abx  Diagnostic testing considered: ua/labs  Discussion of Management with Other Providers: I discussed the patient/results with: community care coordinator    Objective Data  I have independently interpreted the following labs, imaging studies and MDM added to ED Course  Labs Reviewed   SYPHILIS SCREENING WITH REFLEX - Normal       Result Value    Syphilis Total Ab Nonreactive     HIV 1/2 ANTIGEN/ANTIBODY SCREEN WIH REFLEX TO CONFIRMATION - Normal    HIV 1/2 Antigen/Antibody Screen with Reflex to Confirmation Nonreactive      Narrative:     HIV Ag/Ab screen is performed using the Siemens [employer] HIV Ag/Ab Combo assay which detects the presence of HIV p24 antigen as well as antibodies to HIV-1 (Group M and O) and HIV-2.  No laboratory evidence of HIV infection. If acute HIV infection is suspected, consider testing for HIV RNA by PCR (viral load).   C. TRACHOMATIS + N. GONORRHOEAE, AMPLIFIED - Normal    Neisseria gonorrhea,Amplified Negative      Chlamydia trachomatis, Amplified Negative      Narrative:     The APTIMA Combo 2 assay is FDA-approved NAAT using target  capture for the in vitro qualitative detection and differentiation of ribosomal RNA (rRNA) for Chlamydia trachomatis and Neisseria gonorrhoeae testing on clinician-collected endocervical, PreservCyt solution liquid Pap specimens, vaginal, throat, rectal, and male urethral swab specimens; patient-collected vaginal swab specimens, and female and male urine specimens from symptomatic and asymptomatic individuals. Samples from all other sites are not validated for this method.   URINALYSIS WITH REFLEX CULTURE AND MICROSCOPIC - Normal    Color, Urine Light-Yellow      Appearance, Urine Clear      Specific Gravity, Urine 1.021      pH, Urine 6.0      Protein, Urine NEGATIVE      Glucose, Urine Normal      Blood, Urine NEGATIVE      Ketones, Urine NEGATIVE      Bilirubin, Urine NEGATIVE      Urobilinogen, Urine Normal      Nitrite, Urine NEGATIVE      Leukocyte Esterase, Urine NEGATIVE     HEPATITIS C ANTIBODY - Normal    Hepatitis C AB Nonreactive     URINALYSIS WITH REFLEX CULTURE AND MICROSCOPIC    Narrative:     The following orders were created for panel order Urinalysis with Reflex Culture and Microscopic.  Procedure                               Abnormality         Status                     ---------                               -----------         ------                     Urinalysis with Reflex C...[[id number]]  Normal              Final result               Extra Urine Gray Tube[[id number]]                            Final result                 Please view results for these tests on the individual orders.   EXTRA URINE GRAY TUBE    Extra Tube Hold for add-ons.         No orders to display       ED Course  ED Course as of 03/13/24 0021   Fri Mar 08, 2024   1117 Received call that a woman he had intercourse, unprotected, vaginal with had tested positive for an unknown STI. +dysuria, discharge and small painless lesion on base of penis. No testicular pain, hx of STI or flank pain, n/v noted. Would like tested and  treatment for STI [LP]   1207 UA negative, remainder of results pending. Will dc home with doxycline [LP]      ED Course User Index  [LP] Jovana Urrutia DO         Diagnoses as of 03/13/24 0021   STD exposure       Procedure  Procedures    Disposition: discharged    Jovana Urrutia DO  Emergency Medicine  Medical Toxicology     Jovana Urrutia DO  03/13/24 0021

## 2024-03-09 LAB
C TRACH RRNA SPEC QL NAA+PROBE: NEGATIVE
N GONORRHOEA DNA SPEC QL PROBE+SIG AMP: NEGATIVE

## 2024-03-11 ENCOUNTER — DOCUMENTATION (OUTPATIENT)
Dept: EMERGENCY MEDICINE | Facility: HOSPITAL | Age: 34
End: 2024-03-11
Payer: MEDICAID

## 2024-03-11 NOTE — RESEARCH NOTES
11:05 AM    Test:   Discussed HIV/HCV testing with the patient in the ED.   Patient received [HIV and HCV] test today    Test Result:  HIV [Negative]  HCV [Negative]    Result notification:  Patient was notified of their test results on [03-] via phone after verifying 3 patient identifiers]    Follow-up plan:   Patient was referred to [No Referral] on [Date]  Patient has appointment at [No Appointment] on [Date]  Barriers to attending appointment: [None]  Missed appointments: [None]    Signed  Cassius Rosenthal Jr.  HIV/HCV FOCUS Program  Department of Emergency Medicine - Research  Please contact me via email or Epic Chat with questions     Alert-The patient is alert, awake and responds to voice. The patient is oriented to time, place, and person. The triage nurse is able to obtain subjective information.

## 2024-04-03 ENCOUNTER — HOSPITAL ENCOUNTER (EMERGENCY)
Facility: HOSPITAL | Age: 34
Discharge: HOME | End: 2024-04-03
Payer: MEDICAID

## 2024-04-03 ENCOUNTER — APPOINTMENT (OUTPATIENT)
Dept: RADIOLOGY | Facility: HOSPITAL | Age: 34
End: 2024-04-03
Payer: MEDICAID

## 2024-04-03 VITALS
RESPIRATION RATE: 16 BRPM | HEART RATE: 84 BPM | OXYGEN SATURATION: 98 % | SYSTOLIC BLOOD PRESSURE: 111 MMHG | TEMPERATURE: 98.5 F | DIASTOLIC BLOOD PRESSURE: 72 MMHG

## 2024-04-03 DIAGNOSIS — J06.9 VIRAL URI: Primary | ICD-10-CM

## 2024-04-03 LAB
FLUAV RNA RESP QL NAA+PROBE: NOT DETECTED
FLUBV RNA RESP QL NAA+PROBE: NOT DETECTED
SARS-COV-2 RNA RESP QL NAA+PROBE: NOT DETECTED

## 2024-04-03 PROCEDURE — 87636 SARSCOV2 & INF A&B AMP PRB: CPT | Performed by: PHYSICIAN ASSISTANT

## 2024-04-03 PROCEDURE — 71046 X-RAY EXAM CHEST 2 VIEWS: CPT

## 2024-04-03 PROCEDURE — 71046 X-RAY EXAM CHEST 2 VIEWS: CPT | Mod: FOREIGN READ | Performed by: RADIOLOGY

## 2024-04-03 PROCEDURE — 99284 EMERGENCY DEPT VISIT MOD MDM: CPT | Performed by: PHYSICIAN ASSISTANT

## 2024-04-03 PROCEDURE — 99283 EMERGENCY DEPT VISIT LOW MDM: CPT

## 2024-04-03 PROCEDURE — 2500000001 HC RX 250 WO HCPCS SELF ADMINISTERED DRUGS (ALT 637 FOR MEDICARE OP): Mod: SE | Performed by: PHYSICIAN ASSISTANT

## 2024-04-03 RX ORDER — ACETAMINOPHEN 325 MG/1
975 TABLET ORAL ONCE
Status: COMPLETED | OUTPATIENT
Start: 2024-04-03 | End: 2024-04-03

## 2024-04-03 RX ORDER — GUAIFENESIN 600 MG/1
1200 TABLET, EXTENDED RELEASE ORAL 2 TIMES DAILY PRN
Qty: 20 TABLET | Refills: 0 | Status: SHIPPED | OUTPATIENT
Start: 2024-04-03

## 2024-04-03 RX ORDER — BENZONATATE 100 MG/1
200 CAPSULE ORAL 3 TIMES DAILY PRN
Qty: 20 CAPSULE | Refills: 0 | Status: SHIPPED | OUTPATIENT
Start: 2024-04-03

## 2024-04-03 RX ORDER — IBUPROFEN 600 MG/1
600 TABLET ORAL EVERY 6 HOURS PRN
Qty: 30 TABLET | Refills: 0 | Status: SHIPPED | OUTPATIENT
Start: 2024-04-03

## 2024-04-03 RX ORDER — GUAIFENESIN 100 MG/5ML
400 SOLUTION ORAL ONCE
Status: COMPLETED | OUTPATIENT
Start: 2024-04-03 | End: 2024-04-03

## 2024-04-03 RX ORDER — ACETAMINOPHEN 325 MG/1
650 TABLET ORAL EVERY 6 HOURS PRN
Qty: 30 TABLET | Refills: 0 | Status: SHIPPED | OUTPATIENT
Start: 2024-04-03

## 2024-04-03 RX ORDER — IBUPROFEN 600 MG/1
600 TABLET ORAL ONCE
Status: COMPLETED | OUTPATIENT
Start: 2024-04-03 | End: 2024-04-03

## 2024-04-03 RX ORDER — BENZONATATE 100 MG/1
200 CAPSULE ORAL ONCE
Status: COMPLETED | OUTPATIENT
Start: 2024-04-03 | End: 2024-04-03

## 2024-04-03 RX ADMIN — GUAIFENESIN 400 MG: 200 SOLUTION ORAL at 10:43

## 2024-04-03 RX ADMIN — IBUPROFEN 600 MG: 600 TABLET, FILM COATED ORAL at 10:43

## 2024-04-03 RX ADMIN — ACETAMINOPHEN 975 MG: 325 TABLET ORAL at 10:43

## 2024-04-03 RX ADMIN — BENZONATATE 200 MG: 100 CAPSULE ORAL at 10:43

## 2024-04-03 NOTE — Clinical Note
Norbert Haro was seen and treated in our emergency department on 4/3/2024.  He may return to work on 04/06/2024.       If you have any questions or concerns, please don't hesitate to call.      Miguel Gasca PA-C

## 2024-04-03 NOTE — DISCHARGE INSTRUCTIONS
Your x-ray showed no signs of pneumonia.  You most likely have a viral upper respiratory infection.  Take the medications to help your symptoms, additionally take other over-the-counter remedies to help.  If your symptoms persist follow-up with your PCP

## 2024-04-28 ENCOUNTER — HOSPITAL ENCOUNTER (EMERGENCY)
Facility: HOSPITAL | Age: 34
Discharge: HOME | End: 2024-04-28
Attending: EMERGENCY MEDICINE
Payer: MEDICAID

## 2024-04-28 VITALS
HEIGHT: 70 IN | TEMPERATURE: 97.5 F | OXYGEN SATURATION: 98 % | RESPIRATION RATE: 16 BRPM | DIASTOLIC BLOOD PRESSURE: 79 MMHG | HEART RATE: 85 BPM | BODY MASS INDEX: 22.9 KG/M2 | SYSTOLIC BLOOD PRESSURE: 122 MMHG | WEIGHT: 160 LBS

## 2024-04-28 DIAGNOSIS — Z20.2 STD EXPOSURE: Primary | ICD-10-CM

## 2024-04-28 LAB
APPEARANCE UR: CLEAR
BILIRUB UR STRIP.AUTO-MCNC: NEGATIVE MG/DL
COLOR UR: NORMAL
GLUCOSE UR STRIP.AUTO-MCNC: NORMAL MG/DL
HIV 1+2 AB+HIV1 P24 AG SERPL QL IA: NONREACTIVE
KETONES UR STRIP.AUTO-MCNC: NEGATIVE MG/DL
LEUKOCYTE ESTERASE UR QL STRIP.AUTO: NEGATIVE
NITRITE UR QL STRIP.AUTO: NEGATIVE
PH UR STRIP.AUTO: 6 [PH]
PROT UR STRIP.AUTO-MCNC: NEGATIVE MG/DL
RBC # UR STRIP.AUTO: NEGATIVE /UL
SP GR UR STRIP.AUTO: 1.03
TREPONEMA PALLIDUM IGG+IGM AB [PRESENCE] IN SERUM OR PLASMA BY IMMUNOASSAY: NONREACTIVE
UROBILINOGEN UR STRIP.AUTO-MCNC: NORMAL MG/DL

## 2024-04-28 PROCEDURE — 87800 DETECT AGNT MULT DNA DIREC: CPT | Performed by: STUDENT IN AN ORGANIZED HEALTH CARE EDUCATION/TRAINING PROGRAM

## 2024-04-28 PROCEDURE — 81003 URINALYSIS AUTO W/O SCOPE: CPT | Performed by: STUDENT IN AN ORGANIZED HEALTH CARE EDUCATION/TRAINING PROGRAM

## 2024-04-28 PROCEDURE — 96372 THER/PROPH/DIAG INJ SC/IM: CPT | Performed by: NURSE PRACTITIONER

## 2024-04-28 PROCEDURE — 2500000004 HC RX 250 GENERAL PHARMACY W/ HCPCS (ALT 636 FOR OP/ED): Mod: SE | Performed by: NURSE PRACTITIONER

## 2024-04-28 PROCEDURE — 87389 HIV-1 AG W/HIV-1&-2 AB AG IA: CPT | Performed by: STUDENT IN AN ORGANIZED HEALTH CARE EDUCATION/TRAINING PROGRAM

## 2024-04-28 PROCEDURE — 2500000001 HC RX 250 WO HCPCS SELF ADMINISTERED DRUGS (ALT 637 FOR MEDICARE OP): Mod: SE | Performed by: NURSE PRACTITIONER

## 2024-04-28 PROCEDURE — 99283 EMERGENCY DEPT VISIT LOW MDM: CPT

## 2024-04-28 PROCEDURE — 87661 TRICHOMONAS VAGINALIS AMPLIF: CPT | Performed by: STUDENT IN AN ORGANIZED HEALTH CARE EDUCATION/TRAINING PROGRAM

## 2024-04-28 PROCEDURE — 36415 COLL VENOUS BLD VENIPUNCTURE: CPT | Performed by: STUDENT IN AN ORGANIZED HEALTH CARE EDUCATION/TRAINING PROGRAM

## 2024-04-28 PROCEDURE — 86780 TREPONEMA PALLIDUM: CPT | Performed by: STUDENT IN AN ORGANIZED HEALTH CARE EDUCATION/TRAINING PROGRAM

## 2024-04-28 PROCEDURE — 99284 EMERGENCY DEPT VISIT MOD MDM: CPT | Performed by: EMERGENCY MEDICINE

## 2024-04-28 RX ORDER — DOXYCYCLINE HYCLATE 100 MG
100 TABLET ORAL ONCE
Status: COMPLETED | OUTPATIENT
Start: 2024-04-28 | End: 2024-04-28

## 2024-04-28 RX ORDER — CEFTRIAXONE 500 MG/1
500 INJECTION, POWDER, FOR SOLUTION INTRAMUSCULAR; INTRAVENOUS ONCE
Status: COMPLETED | OUTPATIENT
Start: 2024-04-28 | End: 2024-04-28

## 2024-04-28 RX ORDER — METRONIDAZOLE 500 MG/1
2000 TABLET ORAL ONCE
Status: COMPLETED | OUTPATIENT
Start: 2024-04-28 | End: 2024-04-28

## 2024-04-28 RX ORDER — DOXYCYCLINE 100 MG/1
100 CAPSULE ORAL 2 TIMES DAILY
Qty: 14 CAPSULE | Refills: 0 | Status: SHIPPED | OUTPATIENT
Start: 2024-04-28 | End: 2024-05-05

## 2024-04-28 RX ADMIN — CEFTRIAXONE SODIUM 500 MG: 500 INJECTION, POWDER, FOR SOLUTION INTRAMUSCULAR; INTRAVENOUS at 16:11

## 2024-04-28 RX ADMIN — DOXYCYCLINE HYCLATE 100 MG: 100 TABLET, COATED ORAL at 16:10

## 2024-04-28 RX ADMIN — METRONIDAZOLE 2000 MG: 500 TABLET ORAL at 16:10

## 2024-04-28 NOTE — ED PROVIDER NOTES
"HPI  34-year-old male who endorses a prior history of STI, is unsure of which 1, presents to the ER endorsing penile discharge since yesterday.  States he noticed an abnormal \"bump\" on the base of his penis on Tuesday.  States that he has not been having any dysuria, endorses that the discharge is clear, states that he is worried about an STI.  Denies any other symptoms, no recent fevers, chills, nausea, vomiting, abdominal pain.  Denies any testicular pain or recent trauma.    ED Triage Vitals [04/28/24 1257]   Temperature Heart Rate Respirations BP   36.4 °C (97.5 °F) 85 16 122/79      Pulse Ox Temp Source Heart Rate Source Patient Position   98 % Skin -- Sitting      BP Location FiO2 (%)     Right arm --         Physical Examination  Vital signs reviewed in nursing triage note, on EMR flow sheets, and at bedside.  GEN: Well-developed, well nourished, in no apparent distress.   SKIN: Warm, dry, intact. No gross rashes or lesions.   EYES: Pupils equal, round. EOM grossly intact. Conjunctiva clear.   HENT: Normocephalic, atraumatic, mucous membranes moist.   NECK: Supple, trachea midline.   CV: Regular rate.   PULM: Breathing nonlabored on room air, speaks in full sentences.   GI: Abd soft, nontender, nondistended.   : chaperone present. Area to dorsal aspect of the base of the penis with increased varicosity without ttp, no vesicles appreciable. No urethral discharge present. No scrotal erythema, edema, or pain with palpation. Testes smooth bilaterally.  EXT: Symmetric muscle bulk, moves all equally. No joint swelling, no pedal edema.   NEURO: Alert, CN II-XII grossly intact, no gross focal deficits. Ambulates with normal gait.   PSYCH: Answers questions appropriately with congruent affect.    MDM  34M presents to the emergency department with concern for STI, endorsing penile discharge since yesterday and appreciated a \"bump\" on the base of his penis since earlier this week.  Vital signs stable, patient is " nontoxic.  Exam is as above.  Will plan on urinalysis, urine STI testing in addition to HIV syphilis.  UA pending upon signout to incoming team.     Diagnoses as of 04/30/24 2248   STD exposure        Procedures      Disposition: pending upon signout to incoming team     Discussed with attending physician, Dr. Dilcia Quiles DO  EM PGY3     Kati Quiles DO  Resident  04/30/24 6883

## 2024-04-28 NOTE — PROGRESS NOTES
Patient was handed off to me from the previous team. For full history, physical, and prior ED course, please see previous provider note prior to patient handoff. This is an addendum to the record.    This is a 34 year old male who was a sign out to me pending STD testing. He approached me in the waiting room and wished to be treated for STDs as he has to go to work.   He was treated and was given an Rx for Doxycycline 100 bid x 7 days.     Hospital Course/MDM:  Please see the ED provider note     Disposition:  Discharge     Danae El CNP  Emergency Medicine

## 2024-07-22 ENCOUNTER — APPOINTMENT (OUTPATIENT)
Dept: PREADMISSION TESTING | Facility: HOSPITAL | Age: 34
End: 2024-07-22
Payer: MEDICAID

## 2024-11-18 ENCOUNTER — HOSPITAL ENCOUNTER (EMERGENCY)
Facility: HOSPITAL | Age: 34
Discharge: HOME | End: 2024-11-18
Payer: MEDICAID

## 2024-11-18 ENCOUNTER — APPOINTMENT (OUTPATIENT)
Dept: RADIOLOGY | Facility: HOSPITAL | Age: 34
End: 2024-11-18
Payer: MEDICAID

## 2024-11-18 VITALS
HEIGHT: 71 IN | TEMPERATURE: 98.4 F | HEART RATE: 92 BPM | WEIGHT: 170 LBS | DIASTOLIC BLOOD PRESSURE: 83 MMHG | OXYGEN SATURATION: 96 % | SYSTOLIC BLOOD PRESSURE: 128 MMHG | BODY MASS INDEX: 23.8 KG/M2 | RESPIRATION RATE: 16 BRPM

## 2024-11-18 DIAGNOSIS — M54.41 ACUTE RIGHT-SIDED LOW BACK PAIN WITH RIGHT-SIDED SCIATICA: Primary | ICD-10-CM

## 2024-11-18 PROCEDURE — 99285 EMERGENCY DEPT VISIT HI MDM: CPT | Mod: 25

## 2024-11-18 PROCEDURE — 72131 CT LUMBAR SPINE W/O DYE: CPT

## 2024-11-18 PROCEDURE — 2500000001 HC RX 250 WO HCPCS SELF ADMINISTERED DRUGS (ALT 637 FOR MEDICARE OP): Mod: SE

## 2024-11-18 PROCEDURE — 72128 CT CHEST SPINE W/O DYE: CPT

## 2024-11-18 PROCEDURE — 72128 CT CHEST SPINE W/O DYE: CPT | Performed by: RADIOLOGY

## 2024-11-18 PROCEDURE — 72131 CT LUMBAR SPINE W/O DYE: CPT | Performed by: RADIOLOGY

## 2024-11-18 PROCEDURE — 2500000005 HC RX 250 GENERAL PHARMACY W/O HCPCS: Mod: SE

## 2024-11-18 RX ORDER — IBUPROFEN 600 MG/1
600 TABLET ORAL ONCE
Status: COMPLETED | OUTPATIENT
Start: 2024-11-18 | End: 2024-11-18

## 2024-11-18 RX ORDER — LIDOCAINE 560 MG/1
1 PATCH PERCUTANEOUS; TOPICAL; TRANSDERMAL ONCE
Status: DISCONTINUED | OUTPATIENT
Start: 2024-11-18 | End: 2024-11-18 | Stop reason: HOSPADM

## 2024-11-18 RX ORDER — METHOCARBAMOL 500 MG/1
500 TABLET, FILM COATED ORAL 3 TIMES DAILY
Qty: 9 TABLET | Refills: 0 | Status: SHIPPED | OUTPATIENT
Start: 2024-11-18 | End: 2024-11-21

## 2024-11-18 RX ORDER — ACETAMINOPHEN 325 MG/1
975 TABLET ORAL ONCE
Status: COMPLETED | OUTPATIENT
Start: 2024-11-18 | End: 2024-11-18

## 2024-11-18 RX ORDER — IBUPROFEN 600 MG/1
600 TABLET ORAL EVERY 6 HOURS PRN
Qty: 16 TABLET | Refills: 0 | Status: SHIPPED | OUTPATIENT
Start: 2024-11-18 | End: 2024-11-22

## 2024-11-18 RX ORDER — ACETAMINOPHEN 325 MG/1
650 TABLET ORAL EVERY 6 HOURS PRN
Qty: 20 TABLET | Refills: 0 | Status: SHIPPED | OUTPATIENT
Start: 2024-11-18 | End: 2024-11-23

## 2024-11-18 ASSESSMENT — LIFESTYLE VARIABLES
HAVE PEOPLE ANNOYED YOU BY CRITICIZING YOUR DRINKING: NO
EVER FELT BAD OR GUILTY ABOUT YOUR DRINKING: NO
TOTAL SCORE: 0
EVER HAD A DRINK FIRST THING IN THE MORNING TO STEADY YOUR NERVES TO GET RID OF A HANGOVER: NO
HAVE YOU EVER FELT YOU SHOULD CUT DOWN ON YOUR DRINKING: NO

## 2024-11-18 ASSESSMENT — PAIN DESCRIPTION - DIRECTION: RADIATING_TOWARDS: LOWER

## 2024-11-18 ASSESSMENT — COLUMBIA-SUICIDE SEVERITY RATING SCALE - C-SSRS
1. IN THE PAST MONTH, HAVE YOU WISHED YOU WERE DEAD OR WISHED YOU COULD GO TO SLEEP AND NOT WAKE UP?: NO
6. HAVE YOU EVER DONE ANYTHING, STARTED TO DO ANYTHING, OR PREPARED TO DO ANYTHING TO END YOUR LIFE?: NO
2. HAVE YOU ACTUALLY HAD ANY THOUGHTS OF KILLING YOURSELF?: NO

## 2024-11-18 ASSESSMENT — PAIN SCALES - GENERAL: PAINLEVEL_OUTOF10: 9

## 2024-11-18 ASSESSMENT — PAIN DESCRIPTION - LOCATION: LOCATION: BACK

## 2024-11-18 ASSESSMENT — PAIN - FUNCTIONAL ASSESSMENT: PAIN_FUNCTIONAL_ASSESSMENT: 0-10

## 2024-11-18 ASSESSMENT — PAIN DESCRIPTION - PAIN TYPE: TYPE: ACUTE PAIN

## 2024-11-18 ASSESSMENT — PAIN DESCRIPTION - DESCRIPTORS: DESCRIPTORS: ACHING

## 2024-11-18 NOTE — ED TRIAGE NOTES
Patient presented to the ED with x3 days of lower back pain.  He states he was at work and lifted a heavy item and felt like he pulled a muscle in his lower back.  Now, having spasms and pain that he wants to have evaluated

## 2024-11-18 NOTE — Clinical Note
Norbert Haro was seen and treated in our emergency department on 11/18/2024.  He may return to work on 11/21/2024.       If you have any questions or concerns, please don't hesitate to call.      Manjit Flynn, APRN-CNP

## 2024-11-18 NOTE — ED PROVIDER NOTES
"34-year-old male with history of substance use disorder with cocaine and EtOH avascular necrosis presents for chief complaint of low back pain.  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  Apparently at work, while lifting \"felt something pop\".  States that he feels \"stiff\" in his mid to low back.  Pain worse on movement.  Denies any numbness or tingling.  States that the pain does radiate down his right leg a little bit.  Denies change in urination or bowel movement such incontinence or retention.  Denies fevers.               Visit Vitals  /83 (BP Location: Left arm, Patient Position: Sitting)   Pulse 92   Temp 36.9 °C (98.4 °F) (Temporal)   Resp 16   Ht 1.803 m (5' 11\")   Wt 77.1 kg (170 lb)   SpO2 96%   BMI 23.71 kg/m²   Smoking Status Every Day   BSA 1.97 m²          Physical Exam  Vitals and nursing note reviewed.   Constitutional:       General: He is not in acute distress.     Appearance: He is well-developed.   HENT:      Head: Normocephalic and atraumatic.   Eyes:      Conjunctiva/sclera: Conjunctivae normal.   Cardiovascular:      Rate and Rhythm: Normal rate and regular rhythm.      Heart sounds: No murmur heard.  Pulmonary:      Effort: Pulmonary effort is normal. No respiratory distress.      Breath sounds: Normal breath sounds.   Abdominal:      Palpations: Abdomen is soft.      Tenderness: There is no abdominal tenderness.   Musculoskeletal:         General: No swelling.      Cervical back: Neck supple.      Comments: No midline C, T, L-spine tenderness   Skin:     General: Skin is warm and dry.      Capillary Refill: Capillary refill takes less than 2 seconds.   Neurological:      Mental Status: He is alert and oriented to person, place, and time.      GCS: GCS eye subscore is 4. GCS verbal subscore is 5. GCS motor subscore is 6.      Cranial Nerves: Cranial nerves 2-12 are intact.      Sensory: Sensation is intact.      Motor: Motor function is intact.      " Coordination: Coordination is intact.      Gait: Gait is intact.   Psychiatric:         Mood and Affect: Mood normal.                Labs Reviewed - No data to display    CT thoracic spine wo IV contrast   Final Result   Trace anterolisthesis of L5 on S1 secondary to bilateral L5 pars   interarticularis defects.        No significant spinal canal stenosis throughout the thoracic or   lumbar spine. Moderate neural foraminal narrowing at L5-S1.        MACRO:   None        Signed by: Senait Martinez 11/18/2024 10:33 AM   Dictation workstation:   [id number]      CT lumbar spine wo IV contrast   Final Result   Trace anterolisthesis of L5 on S1 secondary to bilateral L5 pars   interarticularis defects.        No significant spinal canal stenosis throughout the thoracic or   lumbar spine. Moderate neural foraminal narrowing at L5-S1.        MACRO:   None        Signed by: Senait Martinez 11/18/2024 10:33 AM   Dictation workstation:   [id number]              ED Course & MDM     Medical Decision Making  Vital signs reviewed, unremarkable at this time.  Patient is well-appearing and in no apparent distress.  Speaks full sentences without difficulty.  Given Tylenol, Motrin, lidocaine patch for pain control, as the patient is driving.  Muscle relaxers will be prescribed likely. CT scans of the T and L-spine showed trace anterolisthesis of L5 on S1 secondary to bilateral L5 pars internal articularis defects.  No significant spinal canal stenosis throughout the thoracic or lumbar spine.  Moderate neural feldman narrowing at L5-S1.  Findings.  States that overall he is feeling improved.  Advised to follow-up with primary care and to return to the ED with any new or worsening symptoms.  Encouraged take all meds as directed.  Patient in agreement with this plan.  Discharged in stable condition.         Diagnoses as of 11/18/24 1109   Acute right-sided low back pain with right-sided sciatica           Procedures    ANA Day, PAThaddeusC      Manjit Flynn, APRN-CNP  11/18/24 1101

## 2024-12-02 ENCOUNTER — HOSPITAL ENCOUNTER (EMERGENCY)
Facility: HOSPITAL | Age: 34
Discharge: HOME | End: 2024-12-02
Payer: MEDICAID

## 2024-12-02 VITALS
HEART RATE: 80 BPM | SYSTOLIC BLOOD PRESSURE: 146 MMHG | BODY MASS INDEX: 23.8 KG/M2 | RESPIRATION RATE: 16 BRPM | OXYGEN SATURATION: 98 % | WEIGHT: 170 LBS | DIASTOLIC BLOOD PRESSURE: 85 MMHG | TEMPERATURE: 97.6 F | HEIGHT: 71 IN

## 2024-12-02 DIAGNOSIS — M54.40 BACK PAIN OF LUMBAR REGION WITH SCIATICA: Primary | ICD-10-CM

## 2024-12-02 PROCEDURE — 99284 EMERGENCY DEPT VISIT MOD MDM: CPT | Performed by: PHYSICIAN ASSISTANT

## 2024-12-02 PROCEDURE — 96372 THER/PROPH/DIAG INJ SC/IM: CPT

## 2024-12-02 PROCEDURE — 99284 EMERGENCY DEPT VISIT MOD MDM: CPT

## 2024-12-02 PROCEDURE — 2500000004 HC RX 250 GENERAL PHARMACY W/ HCPCS (ALT 636 FOR OP/ED): Mod: SE

## 2024-12-02 RX ORDER — METHYLPREDNISOLONE 4 MG/1
TABLET ORAL
Qty: 21 TABLET | Refills: 0 | Status: SHIPPED | OUTPATIENT
Start: 2024-12-02 | End: 2024-12-08

## 2024-12-02 RX ORDER — METHOCARBAMOL 500 MG/1
500 TABLET, FILM COATED ORAL 2 TIMES DAILY PRN
Qty: 20 TABLET | Refills: 0 | Status: SHIPPED | OUTPATIENT
Start: 2024-12-02

## 2024-12-02 RX ORDER — ACETAMINOPHEN 500 MG
1000 TABLET ORAL EVERY 6 HOURS PRN
Qty: 30 TABLET | Refills: 0 | Status: SHIPPED | OUTPATIENT
Start: 2024-12-02

## 2024-12-02 RX ORDER — KETOROLAC TROMETHAMINE 30 MG/ML
30 INJECTION, SOLUTION INTRAMUSCULAR; INTRAVENOUS ONCE
Status: COMPLETED | OUTPATIENT
Start: 2024-12-02 | End: 2024-12-02

## 2024-12-02 ASSESSMENT — PAIN - FUNCTIONAL ASSESSMENT: PAIN_FUNCTIONAL_ASSESSMENT: 0-10

## 2024-12-02 ASSESSMENT — PAIN SCALES - GENERAL: PAINLEVEL_OUTOF10: 6

## 2024-12-02 NOTE — ED PROVIDER NOTES
"HPI   Chief Complaint   Patient presents with    Back Pain       34-year-old male presenting today for low back pain.  Was seen here for similar complaints on 11/18.  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  Prior to her first visit, lifted something heavy and felt a \"pop\" in his low back.  Reports pain radiates down his left lower extremity posteriorly that he describes as a sharp pain.  Reports back pain worse with movement and heavy lifting.  Denies saddle anesthesia, bowel or bladder dysfunction, or weakness in his lower extremities.  States that he took it few days off and the back pain resolved but is back to work again and the back pain is still present.  Denies any worsening of the back pain but believes that muscle relaxers and ibuprofen that were prescribed on previous visit are no longer working.              Patient History   Past Medical History:   Diagnosis Date    Joint pain 10/03/2023    Ortho:[facility] hip     No past surgical history on file.  Family History   Problem Relation Name Age of Onset    Lung cancer Father       Social History     Tobacco Use    Smoking status: Every Day     Types: Cigars    Smokeless tobacco: Never   Vaping Use    Vaping status: Never Used   Substance Use Topics    Alcohol use: Not Currently    Drug use: Never       Physical Exam   ED Triage Vitals [12/02/24 0845]   Temperature Heart Rate Respirations BP   36.4 °C (97.6 °F) 80 16 146/85      Pulse Ox Temp src Heart Rate Source Patient Position   98 % -- -- --      BP Location FiO2 (%)     -- --       Physical Exam  Vitals and nursing note reviewed.   Constitutional:       General: He is not in acute distress.     Appearance: Normal appearance. He is not ill-appearing.   HENT:      Head: Normocephalic and atraumatic.      Right Ear: External ear normal.      Left Ear: External ear normal.      Nose: Nose normal. No congestion or rhinorrhea.      Mouth/Throat:      Mouth: Mucous " membranes are moist.      Pharynx: Oropharynx is clear. No oropharyngeal exudate or posterior oropharyngeal erythema.   Eyes:      Extraocular Movements: Extraocular movements intact.      Conjunctiva/sclera: Conjunctivae normal.      Pupils: Pupils are equal, round, and reactive to light.   Cardiovascular:      Rate and Rhythm: Normal rate and regular rhythm.      Heart sounds: Normal heart sounds.   Pulmonary:      Effort: No accessory muscle usage or respiratory distress.      Breath sounds: Normal breath sounds. No wheezing, rhonchi or rales.   Abdominal:      General: Abdomen is flat. Bowel sounds are normal. There is no distension.      Palpations: Abdomen is soft.      Tenderness: There is no abdominal tenderness. There is no right CVA tenderness or left CVA tenderness.   Musculoskeletal:         General: No swelling or deformity. Normal range of motion.      Cervical back: Normal range of motion and neck supple.      Right lower leg: No edema.      Left lower leg: No edema.      Comments: No midline T or L-spine tenderness  Strength equal 5 out of 5 in bilateral lower extremities  Sensation intact in lower extremities   Skin:     General: Skin is warm and dry.      Capillary Refill: Capillary refill takes less than 2 seconds.   Neurological:      General: No focal deficit present.      Mental Status: He is alert and oriented to person, place, and time.      GCS: GCS eye subscore is 4. GCS verbal subscore is 5. GCS motor subscore is 6.      Cranial Nerves: Cranial nerves 2-12 are intact.      Sensory: No sensory deficit.      Motor: Motor function is intact. No weakness.   Psychiatric:         Mood and Affect: Mood and affect normal.         Speech: Speech normal.         Behavior: Behavior normal. Behavior is cooperative.           ED Course & MDM   Diagnoses as of 12/02/24 0946   Back pain of lumbar region with sciatica                 No data recorded     Eli Coma Scale Score: 15 (12/02/24 0844 :  Lynda Green RN)                           Medical Decision Making  34-year-old male presenting today for lower lumbar back pain.  On my exam, no midline T or L-spine tenderness, no weakness in lower extremities, decreased sensation, no saddle anesthesia, bowel or bladder dysfunction and no other red flag symptoms.    I did review imaging performed on previous visit on 11/18 of the CT of L and T-spine including trace anterolisthesis of L5 and S1 secondary to bilateral L5 pars interarticular defects in addition to moderate neural foraminal narrowing at L5-S1 without any significant spinal canal stenosis throughout T or L-spine.  Discussed attempt of relief with patient using Medrol Dosepak in addition to muscle relaxer, lidocaine patch and Tylenol.  Also discussed that likely patient may need physical therapy if continued medication use is not helping with his back pain after 2 weeks.  Patient was given referral to physical therapy in addition to medical spine.  Discussed return precautions and symptomatic control at home.  Was given prescriptions for Tylenol, Medrol Dosepak, Robaxin and lidocaine patches.        Procedure  Procedures     Monae Ryder PA-C  12/02/24 0952

## 2024-12-02 NOTE — Clinical Note
Norbert Haro was seen and treated in our emergency department on 12/2/2024.  He may return to work on 12/06/2024.       If you have any questions or concerns, please don't hesitate to call.      Monae Ryder PA-C

## 2024-12-25 ENCOUNTER — CLINICAL SUPPORT (OUTPATIENT)
Dept: EMERGENCY MEDICINE | Facility: HOSPITAL | Age: 34
End: 2024-12-25
Payer: MEDICAID

## 2024-12-25 ENCOUNTER — HOSPITAL ENCOUNTER (EMERGENCY)
Facility: HOSPITAL | Age: 34
Discharge: HOME | End: 2024-12-25
Payer: MEDICAID

## 2024-12-25 VITALS
SYSTOLIC BLOOD PRESSURE: 118 MMHG | TEMPERATURE: 98.2 F | OXYGEN SATURATION: 99 % | WEIGHT: 170 LBS | RESPIRATION RATE: 17 BRPM | BODY MASS INDEX: 23.8 KG/M2 | HEIGHT: 71 IN | HEART RATE: 75 BPM | DIASTOLIC BLOOD PRESSURE: 73 MMHG

## 2024-12-25 DIAGNOSIS — J06.9 VIRAL URI: Primary | ICD-10-CM

## 2024-12-25 LAB
FLUAV RNA RESP QL NAA+PROBE: NOT DETECTED
FLUBV RNA RESP QL NAA+PROBE: NOT DETECTED
S PYO DNA THROAT QL NAA+PROBE: NOT DETECTED
SARS-COV-2 RNA RESP QL NAA+PROBE: NOT DETECTED

## 2024-12-25 PROCEDURE — 87636 SARSCOV2 & INF A&B AMP PRB: CPT | Performed by: EMERGENCY MEDICINE

## 2024-12-25 PROCEDURE — 99283 EMERGENCY DEPT VISIT LOW MDM: CPT

## 2024-12-25 PROCEDURE — 87651 STREP A DNA AMP PROBE: CPT | Performed by: EMERGENCY MEDICINE

## 2024-12-25 PROCEDURE — 99284 EMERGENCY DEPT VISIT MOD MDM: CPT | Performed by: PHYSICIAN ASSISTANT

## 2024-12-25 PROCEDURE — 2500000001 HC RX 250 WO HCPCS SELF ADMINISTERED DRUGS (ALT 637 FOR MEDICARE OP): Mod: SE | Performed by: PHYSICIAN ASSISTANT

## 2024-12-25 PROCEDURE — 93005 ELECTROCARDIOGRAM TRACING: CPT

## 2024-12-25 RX ORDER — ACETAMINOPHEN 325 MG/1
975 TABLET ORAL ONCE
Status: COMPLETED | OUTPATIENT
Start: 2024-12-25 | End: 2024-12-25

## 2024-12-25 RX ORDER — GUAIFENESIN/DEXTROMETHORPHAN 100-10MG/5
10 SYRUP ORAL ONCE
Status: COMPLETED | OUTPATIENT
Start: 2024-12-25 | End: 2024-12-25

## 2024-12-25 RX ORDER — IBUPROFEN 600 MG/1
600 TABLET ORAL EVERY 6 HOURS PRN
Qty: 30 TABLET | Refills: 0 | Status: SHIPPED | OUTPATIENT
Start: 2024-12-25

## 2024-12-25 RX ORDER — GUAIFENESIN/DEXTROMETHORPHAN 100-10MG/5
10 SYRUP ORAL 3 TIMES DAILY PRN
Qty: 118 ML | Refills: 0 | Status: SHIPPED | OUTPATIENT
Start: 2024-12-25 | End: 2025-01-04

## 2024-12-25 RX ORDER — ACETAMINOPHEN 325 MG/1
650 TABLET ORAL EVERY 6 HOURS PRN
Qty: 30 TABLET | Refills: 0 | Status: SHIPPED | OUTPATIENT
Start: 2024-12-25

## 2024-12-25 RX ADMIN — ACETAMINOPHEN 975 MG: 325 TABLET ORAL at 07:24

## 2024-12-25 RX ADMIN — GUAIFENESIN AND DEXTROMETHORPHAN 10 ML: 20; 200 SYRUP ORAL at 07:24

## 2024-12-25 ASSESSMENT — COLUMBIA-SUICIDE SEVERITY RATING SCALE - C-SSRS
2. HAVE YOU ACTUALLY HAD ANY THOUGHTS OF KILLING YOURSELF?: NO
6. HAVE YOU EVER DONE ANYTHING, STARTED TO DO ANYTHING, OR PREPARED TO DO ANYTHING TO END YOUR LIFE?: NO
1. IN THE PAST MONTH, HAVE YOU WISHED YOU WERE DEAD OR WISHED YOU COULD GO TO SLEEP AND NOT WAKE UP?: NO

## 2024-12-25 ASSESSMENT — PAIN - FUNCTIONAL ASSESSMENT: PAIN_FUNCTIONAL_ASSESSMENT: 0-10

## 2024-12-25 ASSESSMENT — PAIN SCALES - GENERAL: PAINLEVEL_OUTOF10: 8

## 2024-12-25 ASSESSMENT — PAIN DESCRIPTION - LOCATION: LOCATION: THROAT

## 2024-12-25 NOTE — DISCHARGE INSTRUCTIONS
Your flu COVID and strep swabs were negative.    Take the medications to help your symptoms.  Take other over-the-counter remedies as well.    If your symptoms persist follow-up with your PCP

## 2024-12-25 NOTE — Clinical Note
Norbert Haro was seen and treated in our emergency department on 12/25/2024.  He may return to work on 12/26/2024.       If you have any questions or concerns, please don't hesitate to call.      Miguel Gasca PA-C

## 2024-12-25 NOTE — ED PROVIDER NOTES
Emergency Department Provider Note        History of Present Illness     34-year-old male otherwise healthy presents for URI symptoms.  Symptoms been going on for 2 days now.  Symptoms include mild dry cough, rhinorrhea, congestion, sore throat.  Denies any fevers chills night sweats or rigors.  Denies any recent travel or sick contacts.  States he has not taken anything whatsoever for symptoms including over-the-counter remedies.  Denies any chest pain or shortness of breath.  Denies any headache lightheadedness dizziness syncope or near syncope.  Denies any nausea or vomiting.  Denies any trouble breathing or trouble swallowing.    Past Medical History:   Diagnosis Date    Joint pain 10/03/2023    Ortho:[facility] hip     History reviewed. No pertinent surgical history.  Social History     Socioeconomic History    Marital status: Single   Tobacco Use    Smoking status: Every Day     Types: Cigars    Smokeless tobacco: Never   Vaping Use    Vaping status: Never Used   Substance and Sexual Activity    Alcohol use: Not Currently    Drug use: Never    Sexual activity: Defer     Social Drivers of Health     Financial Resource Strain: Not on File (11/19/2019)    Received from [facility]    Financial Resource Strain     Financial Resource Strain: 0   Food Insecurity: Not on File (11/19/2019)    Received from [facility]    Food Insecurity     Food: 0   Transportation Needs: No Transportation Needs (12/29/2023)    OASIS : Transportation     Lack of Transportation (Medical): No     Lack of Transportation (Non-Medical): No     Patient Unable or Declines to Respond: No   Physical Activity: Not on File (11/19/2019)    Received from [facility]    Physical Activity     Physical Activity: 0   Stress: Not on File (11/19/2019)    Received from [facility]    Stress     Stress: 0   Social Connections: Feeling Socially Integrated (12/29/2023)    OASIS : Social Isolation     Frequency of experiencing  loneliness or isolation: Never   Housing Stability: Not on File (2019)    Received from [facility]    Housing Stability     Housin     No Known Allergies      External Records Reviewed including ED notes, H&P, Discharge Summary, outpatient PCP/specialist notes.  Physical Exam       Triage Vitals: T 36.8 °C (98.2 °F)  HR 75  /73  RR 17  O2 99 % None (Room air)  GEN: NAD  EYES:  EOMs grossly intact, anicteric sclera  SLICK: Mucosa moist.  Mild bilateral tonsillar erythema and swelling approximately 2+, no trismus, normal voice, tolerating secretions well without difficulty, no uvula deviation  NECK: Supple.  Mild bilateral submandibular lymphadenopathy present  CARD: RRR  PULMONARY: Moving air well. Clear all lung fields.  ABDOMEN: Soft, no guarding, no rigidity. Nontender. NABS  EXTREMITIES: Full ROM, no pitting edema,   SKIN: Intact, warm and dry  NEURO: Alert and oriented x 3, speech is clear, no obvious deficits noted.         Medical Decision Making & ED Course     34-year-old male otherwise healthy presenting for URI symptoms x 2 days.  On exam he is well-appearing ambulating comfortably.  VSS.  Lungs CTABL.  CV RRR.  Posterior pharynx with moderate signs of strep, given low moderate central score will perform strep test.  Will test for COVID and flu.  Will provide Tylenol and Robitussin.    ED Course as of 12/25/24 0824   Wed Dec 25, 2024   0823 Strep flu and COVID were negative.  He is informed of his results.  States he feels improved after the Robitussin and Tylenol.  Provided prescriptions for symptomatic relief.  Told to follow-up with PCP for persistent symptoms, get plenty of rest and stay well-hydrated.    Return precautions reviewed. [TIARA]      ED Course User Index  [TIARA] Miguel Gasca PA-C         Diagnoses as of 24   Viral URI     No orders to display     Labs Reviewed   GROUP A STREPTOCOCCUS, PCR   SARS-COV-2 AND INFLUENZA A/B PCR        ----------------------------------------------------------------------------------------------------------------------------    This note was dictated using a speech recognition program.  While an attempt was made at proof reading to minimize errors, minor errors in transcription may be present call for questions.     Miguel Gasca PA-C  12/25/24 8388

## 2024-12-25 NOTE — ED TRIAGE NOTES
Pt presents for flu like symptoms (cough, runny nose, fatigue, sore throat, loss in smell/taste, fatigue) for 3 days. Endorses slight chest pan that does not radiate.

## 2024-12-26 LAB
ATRIAL RATE: 71 BPM
P AXIS: 44 DEGREES
P OFFSET: 198 MS
P ONSET: 151 MS
PR INTERVAL: 138 MS
Q ONSET: 220 MS
QRS COUNT: 12 BEATS
QRS DURATION: 82 MS
QT INTERVAL: 360 MS
QTC CALCULATION(BAZETT): 391 MS
QTC FREDERICIA: 381 MS
R AXIS: 96 DEGREES
T AXIS: 17 DEGREES
T OFFSET: 400 MS
VENTRICULAR RATE: 71 BPM

## 2024-12-26 PROCEDURE — 93010 ELECTROCARDIOGRAM REPORT: CPT

## 2025-01-20 ENCOUNTER — HOSPITAL ENCOUNTER (EMERGENCY)
Facility: HOSPITAL | Age: 35
Discharge: HOME | End: 2025-01-20
Payer: MEDICAID

## 2025-01-20 VITALS
HEIGHT: 71 IN | DIASTOLIC BLOOD PRESSURE: 83 MMHG | SYSTOLIC BLOOD PRESSURE: 142 MMHG | TEMPERATURE: 97.5 F | WEIGHT: 170 LBS | HEART RATE: 89 BPM | OXYGEN SATURATION: 98 % | BODY MASS INDEX: 23.8 KG/M2 | RESPIRATION RATE: 16 BRPM

## 2025-01-20 DIAGNOSIS — A64 STD (MALE): ICD-10-CM

## 2025-01-20 DIAGNOSIS — L08.9 INFECTED SEBACEOUS CYST OF SKIN: Primary | ICD-10-CM

## 2025-01-20 DIAGNOSIS — L72.3 INFECTED SEBACEOUS CYST OF SKIN: Primary | ICD-10-CM

## 2025-01-20 PROCEDURE — 96372 THER/PROPH/DIAG INJ SC/IM: CPT | Performed by: NURSE PRACTITIONER

## 2025-01-20 PROCEDURE — 2500000004 HC RX 250 GENERAL PHARMACY W/ HCPCS (ALT 636 FOR OP/ED): Mod: SE | Performed by: NURSE PRACTITIONER

## 2025-01-20 PROCEDURE — 87661 TRICHOMONAS VAGINALIS AMPLIF: CPT | Performed by: NURSE PRACTITIONER

## 2025-01-20 PROCEDURE — 87491 CHLMYD TRACH DNA AMP PROBE: CPT | Performed by: NURSE PRACTITIONER

## 2025-01-20 PROCEDURE — 99284 EMERGENCY DEPT VISIT MOD MDM: CPT | Performed by: NURSE PRACTITIONER

## 2025-01-20 PROCEDURE — 99284 EMERGENCY DEPT VISIT MOD MDM: CPT

## 2025-01-20 RX ORDER — DOXYCYCLINE 100 MG/1
100 CAPSULE ORAL 2 TIMES DAILY
Qty: 14 CAPSULE | Refills: 0 | OUTPATIENT
Start: 2025-01-20 | End: 2025-01-20

## 2025-01-20 RX ORDER — DOXYCYCLINE 100 MG/1
100 CAPSULE ORAL 2 TIMES DAILY
Qty: 14 CAPSULE | Refills: 0 | Status: SHIPPED | OUTPATIENT
Start: 2025-01-20 | End: 2025-01-20

## 2025-01-20 RX ORDER — CEFTRIAXONE 1 G/1
1000 INJECTION, POWDER, FOR SOLUTION INTRAMUSCULAR; INTRAVENOUS ONCE
Status: COMPLETED | OUTPATIENT
Start: 2025-01-20 | End: 2025-01-20

## 2025-01-20 RX ORDER — DOXYCYCLINE 100 MG/1
100 CAPSULE ORAL 2 TIMES DAILY
Qty: 14 CAPSULE | Refills: 0 | Status: SHIPPED | OUTPATIENT
Start: 2025-01-20 | End: 2025-01-27

## 2025-01-20 RX ADMIN — CEFTRIAXONE SODIUM 1000 MG: 1 INJECTION, POWDER, FOR SOLUTION INTRAMUSCULAR; INTRAVENOUS at 09:55

## 2025-01-20 ASSESSMENT — PAIN - FUNCTIONAL ASSESSMENT: PAIN_FUNCTIONAL_ASSESSMENT: 0-10

## 2025-01-20 ASSESSMENT — PAIN SCALES - GENERAL: PAINLEVEL_OUTOF10: 0 - NO PAIN

## 2025-01-20 NOTE — ED PROVIDER NOTES
"Chief Complaint   Patient presents with   • Earache   • Exposure to STD       HPI       35 year old male presents to the Emergency Department today complaining of a \"lump\" in the preauricular region that has been present over the last week. Also notes that he has had penile discharge over the past 2 days. States that he had a sore on the right shaft of his penis that was draining pus. Denies any associated fever, chills, headache, neck pain, chest pain, shortness of breath, abdominal pain, nausea, vomiting, diarrhea, constipation, or urinary symptoms.       History provided by:  Patient             Patient History   Past Medical History:   Diagnosis Date   • Joint pain 10/03/2023    Ortho:[facility] hip     No past surgical history on file.  Family History   Problem Relation Name Age of Onset   • Lung cancer Father       Social History     Tobacco Use   • Smoking status: Every Day     Types: Cigars   • Smokeless tobacco: Never   Vaping Use   • Vaping status: Never Used   Substance Use Topics   • Alcohol use: Not Currently   • Drug use: Never           Physical Exam  Constitutional:       Appearance: Normal appearance.   HENT:      Head: Normocephalic.      Right Ear: Tympanic membrane, ear canal and external ear normal.      Left Ear: Tympanic membrane, ear canal and external ear normal.      Nose: Nose normal.      Mouth/Throat:      Lips: Pink.      Mouth: Mucous membranes are moist.      Dentition: No dental caries.      Pharynx: Oropharynx is clear. Uvula midline. No oropharyngeal exudate or posterior oropharyngeal erythema.      Tonsils: No tonsillar exudate. 1+ on the right. 1+ on the left.   Eyes:      Conjunctiva/sclera: Conjunctivae normal.      Pupils: Pupils are equal, round, and reactive to light.   Cardiovascular:      Rate and Rhythm: Normal rate and regular rhythm.      Heart sounds: No murmur heard.     No friction rub. No gallop.   Pulmonary:      Effort: Pulmonary effort is normal. No " respiratory distress.      Breath sounds: Normal breath sounds. No stridor. No wheezing, rhonchi or rales.   Genitourinary:     Comments: Normal appearing external genitalia. No condylomata wart, herpetic vesicle, or syphilitic chancre. No penile discharge noted. Testicles have normal lie. Cremasteric reflex is intact bilaterally. No masses or lesions noted. There is a scabbed over area to the proximal portion of the right shaft of his penis. No signs of abscess or cellulitis.   Skin:     Comments: Sebaceous cyst noted to the left preauricular region that is approximately the size of a pea.    Neurological:      Mental Status: He is alert.         Labs Reviewed - No data to display    No orders to display            ED Course & MDM   Diagnoses as of 01/20/25 0951   Infected sebaceous cyst of skin   STD (male)           Medical Decision Making  Patient was seen and evaluated by myself. The left preauricular region was cleansed with a Chlora-Prep. Small stab incision with a #11 blade scalpel. Small amount of pus was manually expressed. Tolerated well. Will refer to ENT for further follow up of the left preauricular sebaceous cyst. Urine was sent for Gonorrhea, Chlamydia, and Trichomonas. Will treat him with Rocephin and Doxycycline. Educated that he may need evaluated for other STD's not checked for here today. To refrain from sexual intercourse until cleared in follow up. Follow up with their doctor in 3 days. Return if worse in any way. Discharged in stable condition with computer instructions.    Diagnostic Impression:     1. Acute left preauricular sebaceous cyst with I & D    2. Acute STD     3. IM injection    4. Prescription therapy             Your medication list        ASK your doctor about these medications        Instructions Last Dose Given Next Dose Due   acetaminophen 500 mg tablet  Commonly known as: Tylenol      Take 2 tablets (1,000 mg) by mouth every 6 hours if needed for mild pain (1 - 3).        acetaminophen 325 mg tablet  Commonly known as: TylenoL      Take 2 tablets (650 mg) by mouth every 6 hours if needed for mild pain (1 - 3) or fever (temp greater than 38.0 C).       amLODIPine 5 mg tablet  Commonly known as: Norvasc           ARIPiprazole 5 mg tablet  Commonly known as: Abilify           aspirin 81 mg EC tablet           benzonatate 100 mg capsule  Commonly known as: Tessalon Perles      Take 2 capsules (200 mg) by mouth 3 times a day as needed for cough. Do not crush or chew.       docusate sodium 100 mg capsule  Commonly known as: Colace           doxepin 25 mg capsule  Commonly known as: SINEquan           guaiFENesin 600 mg 12 hr tablet  Commonly known as: Mucinex      Take 2 tablets (1,200 mg) by mouth 2 times a day as needed for congestion or cough. 1-2 tablets, twice daily.   Do not crush, chew, or split.       hydrOXYzine pamoate 50 mg capsule  Commonly known as: Vistaril           ibuprofen 600 mg tablet      Take 1 tablet (600 mg) by mouth every 6 hours if needed for mild pain (1 - 3) or fever (temp greater than 38.0 C).       lidocaine HCL 4 % adhesive patch,medicated      Apply 1 patch topically every 24 (twenty four) hours if needed (back pain). Keep on for 12 hours and off for 12 hours.       methocarbamol 500 mg tablet  Commonly known as: Robaxin      Take 1 tablet (500 mg) by mouth 3 times a day for 3 days.       methocarbamol 500 mg tablet  Commonly known as: Robaxin      Take 1 tablet (500 mg) by mouth 2 times a day as needed for muscle spasms.       omeprazole 40 mg DR capsule  Commonly known as: PriLOSEC           oxyCODONE 5 mg immediate release tablet  Commonly known as: Roxicodone           sodium chloride 0.65 % nasal drops  Commonly known as: Ayr           traZODone 150 mg tablet  Commonly known as: Desyrel                      Procedure  Procedures     Sanjeev Barone, AILYN-CNP  01/20/25 0958

## 2025-06-17 ENCOUNTER — HOSPITAL ENCOUNTER (EMERGENCY)
Facility: HOSPITAL | Age: 35
Discharge: HOME | End: 2025-06-17
Payer: MEDICAID

## 2025-06-17 ENCOUNTER — APPOINTMENT (OUTPATIENT)
Dept: RADIOLOGY | Facility: HOSPITAL | Age: 35
End: 2025-06-17
Payer: MEDICAID

## 2025-06-17 VITALS
SYSTOLIC BLOOD PRESSURE: 134 MMHG | HEART RATE: 89 BPM | TEMPERATURE: 98.6 F | WEIGHT: 170 LBS | HEIGHT: 70 IN | DIASTOLIC BLOOD PRESSURE: 91 MMHG | OXYGEN SATURATION: 98 % | RESPIRATION RATE: 16 BRPM | BODY MASS INDEX: 24.34 KG/M2

## 2025-06-17 DIAGNOSIS — M79.671 FOOT PAIN, BILATERAL: Primary | ICD-10-CM

## 2025-06-17 DIAGNOSIS — M79.672 FOOT PAIN, BILATERAL: Primary | ICD-10-CM

## 2025-06-17 PROCEDURE — 2500000001 HC RX 250 WO HCPCS SELF ADMINISTERED DRUGS (ALT 637 FOR MEDICARE OP): Mod: SE | Performed by: NURSE PRACTITIONER

## 2025-06-17 PROCEDURE — 99283 EMERGENCY DEPT VISIT LOW MDM: CPT

## 2025-06-17 PROCEDURE — 73630 X-RAY EXAM OF FOOT: CPT | Mod: BILATERAL PROCEDURE | Performed by: RADIOLOGY

## 2025-06-17 PROCEDURE — 73630 X-RAY EXAM OF FOOT: CPT | Mod: 50

## 2025-06-17 RX ORDER — ACETAMINOPHEN 500 MG
1000 TABLET ORAL EVERY 8 HOURS PRN
Qty: 30 TABLET | Refills: 0 | Status: SHIPPED | OUTPATIENT
Start: 2025-06-17 | End: 2025-06-27

## 2025-06-17 RX ORDER — ACETAMINOPHEN 325 MG/1
975 TABLET ORAL ONCE
Status: COMPLETED | OUTPATIENT
Start: 2025-06-17 | End: 2025-06-17

## 2025-06-17 RX ADMIN — ACETAMINOPHEN 975 MG: 325 TABLET ORAL at 10:28

## 2025-06-17 ASSESSMENT — ENCOUNTER SYMPTOMS
FEVER: 0
DIZZINESS: 0
NUMBNESS: 1
NECK PAIN: 0
JOINT SWELLING: 0
VOMITING: 0
NAUSEA: 0
COUGH: 0
SHORTNESS OF BREATH: 0
WEAKNESS: 0
BACK PAIN: 0
ABDOMINAL PAIN: 0
CHILLS: 0
HEADACHES: 0

## 2025-06-17 ASSESSMENT — PAIN SCALES - GENERAL: PAINLEVEL_OUTOF10: 7

## 2025-06-17 ASSESSMENT — PAIN - FUNCTIONAL ASSESSMENT: PAIN_FUNCTIONAL_ASSESSMENT: 0-10

## 2025-06-17 NOTE — DISCHARGE INSTRUCTIONS
Your xrays were negative. Tylenol, Ibuprofen as needed for discomfort. Follow up with podiatry if symptoms persist

## 2025-06-17 NOTE — ED PROVIDER NOTES
HPI   Chief Complaint   Patient presents with    Foot Pain       HPI  This is a 35 year old  male with no significant medical history who presents to the Emergency Department today with complaints of bilateral foot 'tightness' with tingling sensation. Symptoms developed s/p mechanical fall at home, tripped and fell down 8-10 steps with head injury. He was evaluated at [facility] ED s/p fall on 6/12, pan scan was unremarkable and discharged home. Reports that foot xrays were not performed. He has been able to ambulate independently at home with some discomfort. Denies any headache, dizziness, neck/back pain, CP, SOB, n/v, abd pain, weakness. Has taken Ibuprofen at home with some improvement. No new injuries.    Review of Systems   Constitutional:  Negative for chills and fever.   Respiratory:  Negative for cough and shortness of breath.    Cardiovascular:  Negative for chest pain.   Gastrointestinal:  Negative for abdominal pain, nausea and vomiting.   Musculoskeletal:  Negative for back pain, gait problem, joint swelling and neck pain.   Skin:  Negative for rash.   Neurological:  Positive for numbness. Negative for dizziness, weakness and headaches.           Patient History   Medical History[1]  Surgical History[2]  Family History[3]  Social History[4]    Physical Exam   ED Triage Vitals [06/17/25 0922]   Temperature Heart Rate Respirations BP   37 °C (98.6 °F) 89 16 (!) 134/91      Pulse Ox Temp src Heart Rate Source Patient Position   98 % -- -- --      BP Location FiO2 (%)     -- --       Physical Exam  Vitals reviewed.   Constitutional:       Appearance: Normal appearance. He is not ill-appearing.   HENT:      Head: Normocephalic and atraumatic.      Mouth/Throat:      Mouth: Mucous membranes are moist.      Pharynx: No oropharyngeal exudate or posterior oropharyngeal erythema.   Cardiovascular:      Rate and Rhythm: Normal rate and regular rhythm.      Heart sounds: Normal heart sounds.   Pulmonary:       Effort: Pulmonary effort is normal. No respiratory distress.      Breath sounds: Normal breath sounds. No wheezing, rhonchi or rales.   Musculoskeletal:         General: No swelling or tenderness. Normal range of motion.      Cervical back: Neck supple.      Right lower leg: No edema.      Left lower leg: No edema.      Comments: +2 pedal pulses   Skin:     General: Skin is warm and dry.      Findings: No bruising or rash.   Neurological:      General: No focal deficit present.      Mental Status: He is alert and oriented to person, place, and time.      Sensory: No sensory deficit.      Motor: No weakness.      Gait: Gait normal.           ED Course & MDM   Diagnoses as of 06/17/25 1307   Foot pain, bilateral             XR foot 3+ views bilateral  Result Date: 6/17/2025  STUDY: Bilateral Foot Radiographs; 6/17/25 at 10:43 AM INDICATION: Bilateral foot pain post fall. COMPARISON: None available. ACCESSION NUMBER(S): [id number] ORDERING CLINICIAN: LIN LIANG TECHNIQUE:  Four view(s) of the right foot and three view(s) of the left foot. FINDINGS:  RIGHT FOOT:  There is no displaced fracture.  The alignment is anatomic.  No soft tissue abnormality is seen. LEFT FOOT:  There is no displaced fracture.  The alignment is anatomic.  No soft tissue abnormality is seen.    No acute findings. Signed by Emre Canada MD       No data recorded     Eli Coma Scale Score: 15 (06/17/25 0923 : Chiqui Hsieh RN)                           Medical Decision Making  The patient remains clinically stable while in the ED. 35 year old male otherwise healthy presents to the ED with complaints of bilateral foot pain s/p mechanical fall on 6/12 reports that he tripped and fell down 8-10 steps. Was evaluated at outside hospital ED pan scan was unremarkable. Continues to endorse pain to bilateral feet. No new injuries. Reports xray feet was not performed at outside hospital. He is well appearing, resting comfortably without  distress. Ambulating independently without difficulty. XR bilateral feet did not reveal any acute findings. Results discussed with patient. He is stable for discharge home. Encouraged to take Tylenol and Motrin PRN for discomfort. Return precautions discussed. He did verbalize understanding and remains in stable condition at the time of discharge.    Procedure  Procedures         [1]   Past Medical History:  Diagnosis Date    Joint pain 10/03/2023    Ortho:[facility] hip   [2] No past surgical history on file.  [3]   Family History  Problem Relation Name Age of Onset    Lung cancer Father     [4]   Social History  Tobacco Use    Smoking status: Every Day     Types: Cigars    Smokeless tobacco: Never   Vaping Use    Vaping status: Never Used   Substance Use Topics    Alcohol use: Not Currently    Drug use: Never        AILYN Peterson-TREVER  06/17/25 8918

## 2025-06-17 NOTE — ED TRIAGE NOTES
Patient had a fall last week. Pan scan negative. Complaints of numbness to bilateral feet. Ambulates with a steady gait

## 2025-08-26 ENCOUNTER — APPOINTMENT (OUTPATIENT)
Facility: CLINIC | Age: 35
End: 2025-08-26
Payer: MEDICAID

## 2025-09-02 ENCOUNTER — APPOINTMENT (OUTPATIENT)
Facility: CLINIC | Age: 35
End: 2025-09-02
Payer: MEDICAID

## 2025-09-02 ASSESSMENT — ENCOUNTER SYMPTOMS
DEPRESSION: 1
OCCASIONAL FEELINGS OF UNSTEADINESS: 0
MYALGIAS: 1
ARTHRALGIAS: 1
BACK PAIN: 1
LOSS OF SENSATION IN FEET: 0

## 2025-09-02 ASSESSMENT — PATIENT HEALTH QUESTIONNAIRE - PHQ9
SUM OF ALL RESPONSES TO PHQ9 QUESTIONS 1 AND 2: 0
1. LITTLE INTEREST OR PLEASURE IN DOING THINGS: NOT AT ALL
2. FEELING DOWN, DEPRESSED OR HOPELESS: NOT AT ALL

## 2025-09-02 ASSESSMENT — PAIN SCALES - GENERAL: PAINLEVEL_OUTOF10: 7

## 2025-09-22 ENCOUNTER — APPOINTMENT (OUTPATIENT)
Dept: ORTHOPEDIC SURGERY | Facility: HOSPITAL | Age: 35
End: 2025-09-22
Payer: MEDICAID

## 2025-10-15 ENCOUNTER — APPOINTMENT (OUTPATIENT)
Facility: CLINIC | Age: 35
End: 2025-10-15
Payer: MEDICAID

## (undated) DEVICE — TRAY, MINOR, SINGLE BASIN, STERILE

## (undated) DEVICE — MAYO TRAY, LARGE

## (undated) DEVICE — DRAIN, WOUND, ROUND, W/TROCAR, HOLE PATTERN, 10 IN, MEDIUM, 1/8 X 49 IN

## (undated) DEVICE — BLADE, RECIPROCATING, LONG, HEAVY DUTY, FIXED POINT .14

## (undated) DEVICE — HIGH FLOW TIP FOR INTERPULSE HANDPIECE SET

## (undated) DEVICE — DRAPE, TIBURON, SPLIT SHEET, REINF ADH STRIP, 77X122

## (undated) DEVICE — ELECTRODE, ELECTROSURGICAL, BLADE, INSULATED, ENT/IMA, STERILE

## (undated) DEVICE — SUTURE, PDS II, 0, 27 IN, CT-2, VIOLET

## (undated) DEVICE — Device

## (undated) DEVICE — STAPLER, SKIN PROXIMATE, 35 WIDE

## (undated) DEVICE — MANIFOLD, 4 PORT NEPTUNE STANDARD

## (undated) DEVICE — SUTURE, MONOCRYL, 2-0, 27 IN, SH/V-20 , UNDYED

## (undated) DEVICE — COVER, C-ARM W/CLIPS, OEC GE

## (undated) DEVICE — SUTURE, MONOCRYL, 4-0, 18 IN, PS2, UNDYED

## (undated) DEVICE — EVACUATOR, WOUND, CLOSED, 3 SPRING, 400 CC, Y CONNECTING TUBE

## (undated) DEVICE — NEEDLE, EPIDURAL, TUOHY, 18 G X 3.5 IN

## (undated) DEVICE — SUTURE, ETHIBOND, 5, 30 IN, V40, MULTIPACK, GREEN

## (undated) DEVICE — COVER, CART, 45 X 27 X 48 IN, CLEAR

## (undated) DEVICE — SYRINGE, 35 CC, LUER LOCK, MONOJECT, LF